# Patient Record
Sex: MALE | Race: WHITE | Employment: FULL TIME | URBAN - NONMETROPOLITAN AREA
[De-identification: names, ages, dates, MRNs, and addresses within clinical notes are randomized per-mention and may not be internally consistent; named-entity substitution may affect disease eponyms.]

---

## 2020-01-02 ENCOUNTER — HOSPITAL ENCOUNTER (OUTPATIENT)
Age: 60
Discharge: HOME | End: 2020-01-02
Payer: COMMERCIAL

## 2020-01-03 ENCOUNTER — HOSPITAL ENCOUNTER (OUTPATIENT)
Dept: GENERAL RADIOLOGY | Age: 60
Discharge: HOME | End: 2020-01-03
Payer: COMMERCIAL

## 2020-01-03 PROCEDURE — 71046 X-RAY EXAM CHEST 2 VIEWS: CPT

## 2020-03-24 ENCOUNTER — OFFICE VISIT (OUTPATIENT)
Dept: PRIMARY CARE CLINIC | Age: 60
End: 2020-03-24
Payer: COMMERCIAL

## 2020-03-24 VITALS
OXYGEN SATURATION: 97 % | TEMPERATURE: 97.7 F | SYSTOLIC BLOOD PRESSURE: 186 MMHG | HEART RATE: 122 BPM | DIASTOLIC BLOOD PRESSURE: 96 MMHG

## 2020-03-24 PROCEDURE — 99203 OFFICE O/P NEW LOW 30 MIN: CPT | Performed by: NURSE PRACTITIONER

## 2020-03-24 RX ORDER — SIMVASTATIN 20 MG
20 TABLET ORAL NIGHTLY
COMMUNITY

## 2020-03-24 RX ORDER — FINASTERIDE 5 MG/1
5 TABLET, FILM COATED ORAL DAILY
COMMUNITY

## 2020-03-24 RX ORDER — TAMSULOSIN HYDROCHLORIDE 0.4 MG/1
0.4 CAPSULE ORAL NIGHTLY
COMMUNITY

## 2020-03-24 RX ORDER — FLUTICASONE PROPIONATE 50 MCG
1 SPRAY, SUSPENSION (ML) NASAL DAILY
COMMUNITY

## 2020-03-24 RX ORDER — ALBUTEROL SULFATE 90 UG/1
1 AEROSOL, METERED RESPIRATORY (INHALATION) EVERY 4 HOURS PRN
COMMUNITY

## 2020-03-24 RX ORDER — BENAZEPRIL HYDROCHLORIDE 20 MG/1
20 TABLET ORAL DAILY
COMMUNITY

## 2020-03-24 RX ORDER — GLIMEPIRIDE 4 MG/1
4 TABLET ORAL
COMMUNITY

## 2020-03-24 RX ORDER — ALLOPURINOL 300 MG/1
300 TABLET ORAL DAILY
COMMUNITY

## 2020-03-24 ASSESSMENT — ENCOUNTER SYMPTOMS
EYES NEGATIVE: 1
ABDOMINAL PAIN: 0
EYE PAIN: 0
COLOR CHANGE: 0
SORE THROAT: 0
VOMITING: 0
NAUSEA: 0
TROUBLE SWALLOWING: 0
RHINORRHEA: 0
DIARRHEA: 0
SHORTNESS OF BREATH: 1
ALLERGIC/IMMUNOLOGIC NEGATIVE: 1
WHEEZING: 0
GASTROINTESTINAL NEGATIVE: 1
EYE REDNESS: 0
COUGH: 0

## 2020-03-24 NOTE — PROGRESS NOTES
[address]  [address]  [place], [address]  [phone]      Elicia Pretty is a 60 y.o. male that presents for Shortness of Breath (He is SOB, no cough, no vomiting or diarrhea, no headache.)      URI Symptoms    HPI:      Symptoms have been present for 7-10 days  Symptoms are unchanged since they initially started. Reports that one day he had loss of sense of taste and smell, this resolved. His PCP ordered a chest xray back in January because he was having SOB. The SOB has been intermittent, will have intermittent episodes of SOB. He has been using albuterol. He does have a Hx of asthma and he was treated with Breo, but has been out of it for about 3 weeks. Noticed his breathing has gotten worse since. He contacted his mail order several weeks ago but hasn't heard back from them about the Rx. He has been using his albuterol several times/day. He has been on the [facility] for quite some time    Fever? No  Runny nose or congestion? No   Cough? Yes - some mucous  Sore throat? No  Headache, fatigue, joint pains, muscle aches? No  Shortness of breath/Wheezing? Yes - SOB  Nausea/Vomiting/Diarrhea? No  Double Sickening? No  Sick contacts? No    Patient has tried albuterol without improvement. I have reviewed the patient's past medical history, past surgical history, allergies,medications, social and family history and I have made updates where appropriate. Past Medical History:   Diagnosis Date    Asthma        No past surgical history on file. Social History     Tobacco Use    Smoking status: Never Smoker    Smokeless tobacco: Never Used   Substance Use Topics    Alcohol use: Not on file    Drug use: Not on file       No family history on file. Review of Systems   Constitutional: Negative. Negative for chills, diaphoresis and fatigue. HENT: Negative. Negative for congestion, rhinorrhea, sore throat and trouble swallowing. Eyes: Negative.   Negative for pain, redness and visual disturbance. Respiratory: Positive for shortness of breath. Negative for cough and wheezing. Cardiovascular: Negative. Negative for chest pain. Gastrointestinal: Negative. Negative for abdominal pain, diarrhea, nausea and vomiting. Endocrine: Negative. Negative for polydipsia, polyphagia and polyuria. Genitourinary: Negative. Negative for decreased urine volume, dysuria, frequency and urgency. Musculoskeletal: Negative. Negative for arthralgias and myalgias. Skin: Negative. Negative for color change and rash. Allergic/Immunologic: Negative. Neurological: Negative. Negative for dizziness, tremors, syncope and headaches. Hematological: Negative. Negative for adenopathy. Psychiatric/Behavioral: Negative. All other systems reviewed and are negative.         PHYSICAL EXAM:  BP (!) 186/96   Pulse 122   Temp 97.7 °F (36.5 °C) (Oral)   SpO2 97%     General Appearance: well developed and well- nourished, in no acute distress  Head: normocephalic and atraumatic  Eyes: pupils equal, round, and reactive to light,  conjunctivae and eye lids without erythema  ENT: external ear and ear canal normal bilaterally, nose without deformity, nasal mucosa and turbinates normal without polyps, oropharynx normal, dentition is normal for age, no lip or gum lesions noted  Neck: supple and non-tender without mass, no thyromegaly or thyroid nodules, no cervical lymphadenopathy  Pulmonary/Chest: clear to auscultation bilaterally- no wheezes, rales or rhonchi, normal air movement, no respiratory distress orretractions  Cardiovascular: normal rate, regular rhythm, normal S1 and S2, no murmurs, rubs, clicks, or gallops,distal pulses intact  Abdomen: soft, non-tender, non-distended, normal bowel sounds,  no rebound or guarding, nomasses or hernias noted, no hepatospleenomegaly  Extremities: no cyanosis, clubbing or edema of the lowerextremities  Musculoskeletal: No joint swelling or gross deformity   Neuro:  Alert, 5/5 strength globally and symmetrically,  normal speech, no focal findings or movement disorder noted, gait wnl  Psych:  Normal affect without evidence of depression or anxiety, insight and judgement are appropriate, memory appears intact  Skin: warm and dry, no rash or erythema  Lymph:  No cervical, auricular or supraclavicular lymph nodes palpated    ASSESSMENT & PLAN  Bailey Jamison was seen today for shortness of breath. Diagnoses and all orders for this visit:    Moderate persistent asthma without complication  -     Fluticasone furoate-vilanterol (BREO ELLIPTA) 200-25 MCG/INH AEPB inhaler; Inhale 1 puff into the lungs daily    Encounter for medication refill  -     Fluticasone furoate-vilanterol (BREO ELLIPTA) 200-25 MCG/INH AEPB inhaler; Inhale 1 puff into the lungs daily      - no indications for flu/strep testing, patient's symptoms most likely due to needing his Breo Rx for his asthma. No fever, and no cough, low likelihood of COVID-19.  - medication refill for Breo  - con't albuterol prn  - advised to f/u PCP or ER if symptoms worsening or not improving    Return if symptoms worsen or fail to improve. Bailey Jamison received counseling on the following healthy behaviors: hand hygiene  Reviewedprior labs and health maintenance. Continue current medications, diet and exercise. Discussed use, benefit, and side effects of prescribed medications. Barriers to medication compliance addressed. Patient given educationalmaterials - see patient instructions. All patient questions answered. Patient voiced understanding.

## 2020-03-26 ENCOUNTER — TELEPHONE (OUTPATIENT)
Dept: PRIMARY CARE CLINIC | Age: 60
End: 2020-03-26

## 2020-04-04 ENCOUNTER — OFFICE VISIT (OUTPATIENT)
Dept: PRIMARY CARE CLINIC | Age: 60
End: 2020-04-04
Payer: COMMERCIAL

## 2020-04-04 VITALS
HEART RATE: 98 BPM | DIASTOLIC BLOOD PRESSURE: 92 MMHG | RESPIRATION RATE: 24 BRPM | SYSTOLIC BLOOD PRESSURE: 150 MMHG | TEMPERATURE: 98.1 F | OXYGEN SATURATION: 99 %

## 2020-04-04 PROCEDURE — 99213 OFFICE O/P EST LOW 20 MIN: CPT | Performed by: NURSE PRACTITIONER

## 2020-04-04 RX ORDER — PREDNISONE 20 MG/1
20 TABLET ORAL 2 TIMES DAILY
Qty: 10 TABLET | Refills: 0 | Status: SHIPPED | OUTPATIENT
Start: 2020-04-04 | End: 2020-04-09

## 2020-04-04 RX ORDER — PREDNISONE 20 MG/1
20 TABLET ORAL 2 TIMES DAILY
Qty: 10 TABLET | Refills: 0 | Status: SHIPPED | OUTPATIENT
Start: 2020-04-04 | End: 2020-04-04 | Stop reason: SDUPTHER

## 2020-04-04 ASSESSMENT — ENCOUNTER SYMPTOMS
ABDOMINAL DISTENTION: 0
ABDOMINAL PAIN: 0
STRIDOR: 0
VOMITING: 0
DIARRHEA: 0
CHEST TIGHTNESS: 0
CONSTIPATION: 0
NAUSEA: 0
COLOR CHANGE: 0
BACK PAIN: 0
COUGH: 0
SHORTNESS OF BREATH: 1
WHEEZING: 0
SINUS PRESSURE: 0
SORE THROAT: 0

## 2020-04-04 NOTE — PROGRESS NOTES
[facility]  [facility] [address]  [address] [place] [address]  Dept: [phone]  Dept Fax: [phone]  Loc: [phone]    Artem Garcia is a 60 y.o. male who presents today for his medical conditions/complaints as noted below. Chief Complaint   Patient presents with    Follow-up     here on 3/24. Started in Jan w/SOB. Had CXR in Jan. Was given Breo inhaler. Still SOB. Denies fever or cough. HPI:     HPI    This is a 60year old gentleman who presents today for SOB. Not really any cough or congestion. Mostly SOB with activity. Started in December of last year and went to ED. Checked out and discharged home. Dx with asthma. Did get better but came back around 2 weeks ago. Was seen here and started on Breo. Does help some but still has problems with some SOB. Also has allergies. Denies fever, cough, congestion. Denies any CP. Past Medical History:   Diagnosis Date    Asthma       No past surgical history on file. No family history on file.     Social History     Tobacco Use    Smoking status: Never Smoker    Smokeless tobacco: Never Used   Substance Use Topics    Alcohol use: Not on file      Current Outpatient Medications   Medication Sig Dispense Refill    predniSONE (DELTASONE) 20 MG tablet Take 1 tablet by mouth 2 times daily for 5 days 10 tablet 0    fluticasone (FLONASE) 50 MCG/ACT nasal spray 1 spray by Each Nostril route daily 1-2 sprays in each nostril daily      albuterol sulfate HFA (PROAIR HFA) 108 (90 Base) MCG/ACT inhaler Inhale 1 puff into the lungs every 4 hours as needed for Wheezing      finasteride (PROSCAR) 5 MG tablet Take 5 mg by mouth daily      tamsulosin (FLOMAX) 0.4 MG capsule Take 0.4 mg by mouth nightly      benazepril (LOTENSIN) 20 MG tablet Take 20 mg by mouth daily      allopurinol (ZYLOPRIM) 300 MG tablet Take 300 mg by mouth daily      simvastatin (ZOCOR) 20 MG tablet Take 20 pallor and rash. Neurological: Negative for dizziness, syncope, weakness, light-headedness, numbness and headaches. Hematological: Negative for adenopathy. Psychiatric/Behavioral: Negative for behavioral problems, self-injury and sleep disturbance. The patient is not nervous/anxious. Objective:     Physical Exam  Vitals signs and nursing note reviewed. Constitutional:       Appearance: Normal appearance. He is well-developed. HENT:      Head: Normocephalic. Right Ear: Tympanic membrane and external ear normal.      Left Ear: Tympanic membrane and external ear normal.      Nose: Nose normal.      Right Sinus: No maxillary sinus tenderness. Left Sinus: No maxillary sinus tenderness. Mouth/Throat:      Pharynx: Uvula midline. Eyes:      Pupils: Pupils are equal, round, and reactive to light. Neck:      Musculoskeletal: Normal range of motion and neck supple. Thyroid: No thyromegaly. Vascular: No carotid bruit or JVD. Trachea: Trachea normal.   Cardiovascular:      Rate and Rhythm: Normal rate and regular rhythm. Heart sounds: Normal heart sounds. No murmur. Pulmonary:      Effort: Pulmonary effort is normal. No respiratory distress. Breath sounds: Normal breath sounds. No decreased breath sounds, wheezing, rhonchi or rales. Chest:      Chest wall: No tenderness. Abdominal:      General: Bowel sounds are normal. There is no distension. Palpations: Abdomen is soft. There is no mass. Tenderness: There is no abdominal tenderness. Musculoskeletal: Normal range of motion. General: No tenderness or deformity. Lymphadenopathy:      Cervical: No cervical adenopathy. Skin:     General: Skin is warm and dry. Findings: No erythema or rash. Neurological:      Mental Status: He is alert and oriented to person, place, and time. Cranial Nerves: No cranial nerve deficit. Sensory: No sensory deficit.       Motor: No abnormal muscle tone.      Coordination: Coordination normal.      Gait: Gait normal.   Psychiatric:         Behavior: Behavior normal.         Thought Content: Thought content normal.         Judgment: Judgment normal.       BP (!) 150/92   Pulse 98   Temp 98.1 °F (36.7 °C) (Oral)   Resp 24   SpO2 99%     Assessment:       Diagnosis Orders   1. Dyspnea on exertion         Plan:     Prednisone as prescribed  Monitor sugars  Follow up with PCP for possible PFT  Unlikely related to pandemic    Discussed use, benefit, and side effects of prescribed medications. Barriers tomedication compliance addressed. All patient questions answered. Pt voiced understanding. Return if symptoms worsen or fail to improve. No orders of the defined types were placed in this encounter. Orders Placed This Encounter   Medications    DISCONTD: predniSONE (DELTASONE) 20 MG tablet     Sig: Take 1 tablet by mouth 2 times daily for 5 days     Dispense:  10 tablet     Refill:  0    predniSONE (DELTASONE) 20 MG tablet     Sig: Take 1 tablet by mouth 2 times daily for 5 days     Dispense:  10 tablet     Refill:  0        Reccommended tobacco cessation options including pharmacologicmethods, counseled great than 3 minutes during this visit:  Yes  []  No  []     Patient given educational materials - see patient instructions. Discussed use, benefit, and sideeffects of prescribed medications. All patient questions answered. Pt voiced understanding. Reviewed health maintenance. Instructed to continue current medications, diet and exercise. Patient agreed with treatment plan. Follow up as directed.      Electronically signed by ADRY Day CNP on 4/4/2020 at 3:58 PM

## 2020-05-11 ENCOUNTER — HOSPITAL ENCOUNTER (OUTPATIENT)
Dept: GENERAL RADIOLOGY | Age: 60
Discharge: HOME | End: 2020-05-11
Payer: COMMERCIAL

## 2020-05-11 ENCOUNTER — HOSPITAL ENCOUNTER (OUTPATIENT)
Age: 60
Discharge: HOME | End: 2020-05-11
Payer: COMMERCIAL

## 2020-05-11 PROCEDURE — 73130 X-RAY EXAM OF HAND: CPT

## 2020-05-11 PROCEDURE — 73564 X-RAY EXAM KNEE 4 OR MORE: CPT

## 2020-05-11 PROCEDURE — 73110 X-RAY EXAM OF WRIST: CPT

## 2020-06-19 ENCOUNTER — HOSPITAL ENCOUNTER (OUTPATIENT)
Dept: GENERAL RADIOLOGY | Age: 60
Discharge: HOME | End: 2020-06-19
Payer: COMMERCIAL

## 2020-06-19 ENCOUNTER — HOSPITAL ENCOUNTER (OUTPATIENT)
Age: 60
Discharge: HOME | End: 2020-06-19
Payer: COMMERCIAL

## 2020-06-19 PROCEDURE — 73110 X-RAY EXAM OF WRIST: CPT

## 2020-07-20 ENCOUNTER — HOSPITAL ENCOUNTER (OUTPATIENT)
Dept: GENERAL RADIOLOGY | Age: 60
Discharge: HOME | DRG: 644 | End: 2020-07-20
Payer: COMMERCIAL

## 2020-07-20 ENCOUNTER — HOSPITAL ENCOUNTER (OUTPATIENT)
Age: 60
Discharge: HOME | DRG: 644 | End: 2020-07-20
Payer: COMMERCIAL

## 2020-07-20 PROCEDURE — 73110 X-RAY EXAM OF WRIST: CPT

## 2020-07-21 ENCOUNTER — APPOINTMENT (OUTPATIENT)
Dept: CT IMAGING | Age: 60
DRG: 644 | End: 2020-07-21
Payer: COMMERCIAL

## 2020-07-21 ENCOUNTER — HOSPITAL ENCOUNTER (INPATIENT)
Age: 60
LOS: 5 days | Discharge: ANOTHER HOSPITAL | DRG: 644 | End: 2020-07-26
Attending: EMERGENCY MEDICINE | Admitting: INTERNAL MEDICINE
Payer: COMMERCIAL

## 2020-07-21 PROBLEM — E87.1 HYPONATREMIA: Status: ACTIVE | Noted: 2020-07-21

## 2020-07-21 LAB
ANION GAP: 7 MEQ/L (ref 8–16)
BASOPHILS # BLD: 0.3 % (ref 0–3)
BUN BLDV-MCNC: 14 MG/DL (ref 7–18)
CHLORIDE BLD-SCNC: 94 MEQ/L (ref 98–107)
CO2: 26 MEQ/L (ref 21–32)
CREAT SERPL-MCNC: 1.1 MG/DL (ref 0.6–1.3)
EKG ATRIAL RATE: 54 BPM
EKG P AXIS: 43 DEGREES
EKG P-R INTERVAL: 204 MS
EKG Q-T INTERVAL: 442 MS
EKG QRS DURATION: 98 MS
EKG QTC CALCULATION (BAZETT): 419 MS
EKG R AXIS: 22 DEGREES
EKG T AXIS: 63 DEGREES
EKG VENTRICULAR RATE: 54 BPM
EOSINOPHILS RELATIVE PERCENT: 2.4 % (ref 0–4)
GFR, ESTIMATED: 72 ML/MIN/1.73M2
GLUCOSE BLD-MCNC: 279 MG/DL (ref 74–106)
GLUCOSE BLD-MCNC: 302 MG/DL (ref 70–108)
HCT VFR BLD CALC: 40.6 % (ref 42–52)
HEMOGLOBIN: 13.9 GM/DL (ref 14–18)
LYMPHOCYTES # BLD: 16.9 % (ref 15–47)
MCH RBC QN AUTO: 32 PG (ref 27–31)
MCHC RBC AUTO-ENTMCNC: 34.2 GM/DL (ref 33–37)
MCV RBC AUTO: 93.5 FL (ref 80–94)
MONOCYTES: 7.8 % (ref 0–12)
PDW BLD-RTO: 13.9 % (ref 11.5–14.5)
PLATELET # BLD: 211 THOU/MM3 (ref 130–400)
PMV BLD AUTO: 7.3 FL (ref 7.4–10.4)
POC CALCIUM: 8.3 MG/DL (ref 8.5–10.1)
POTASSIUM SERPL-SCNC: 4.4 MEQ/L (ref 3.5–5.1)
RBC # BLD: 4.34 MILL/MM3 (ref 4.7–6.1)
SARS-COV-2, NAAT: NOT DETECTED
SEGS: 72.6 % (ref 43–75)
SODIUM BLD-SCNC: 127 MEQ/L (ref 136–145)
TROPONIN I: 0.02 NG/ML (ref 0.02–0.05)
WBC # BLD: 6.7 THOU/MM3 (ref 4.8–10.8)

## 2020-07-21 PROCEDURE — 85025 COMPLETE CBC W/AUTO DIFF WBC: CPT

## 2020-07-21 PROCEDURE — 93005 ELECTROCARDIOGRAM TRACING: CPT | Performed by: EMERGENCY MEDICINE

## 2020-07-21 PROCEDURE — 82948 REAGENT STRIP/BLOOD GLUCOSE: CPT

## 2020-07-21 PROCEDURE — 2709999900 HC NON-CHARGEABLE SUPPLY

## 2020-07-21 PROCEDURE — 70450 CT HEAD/BRAIN W/O DYE: CPT

## 2020-07-21 PROCEDURE — 80048 BASIC METABOLIC PNL TOTAL CA: CPT

## 2020-07-21 PROCEDURE — 99223 1ST HOSP IP/OBS HIGH 75: CPT | Performed by: FAMILY MEDICINE

## 2020-07-21 PROCEDURE — 36415 COLL VENOUS BLD VENIPUNCTURE: CPT

## 2020-07-21 PROCEDURE — 99282 EMERGENCY DEPT VISIT SF MDM: CPT

## 2020-07-21 PROCEDURE — BW28ZZZ COMPUTERIZED TOMOGRAPHY (CT SCAN) OF HEAD: ICD-10-PCS | Performed by: FAMILY MEDICINE

## 2020-07-21 PROCEDURE — 96374 THER/PROPH/DIAG INJ IV PUSH: CPT

## 2020-07-21 PROCEDURE — 2580000003 HC RX 258: Performed by: EMERGENCY MEDICINE

## 2020-07-21 PROCEDURE — 2W3CX2Z IMMOBILIZATION OF RIGHT LOWER ARM USING CAST: ICD-10-PCS | Performed by: FAMILY MEDICINE

## 2020-07-21 PROCEDURE — 6360000004 HC RX CONTRAST MEDICATION: Performed by: EMERGENCY MEDICINE

## 2020-07-21 PROCEDURE — 96376 TX/PRO/DX INJ SAME DRUG ADON: CPT

## 2020-07-21 PROCEDURE — 84484 ASSAY OF TROPONIN QUANT: CPT

## 2020-07-21 PROCEDURE — 6360000002 HC RX W HCPCS: Performed by: EMERGENCY MEDICINE

## 2020-07-21 PROCEDURE — U0002 COVID-19 LAB TEST NON-CDC: HCPCS

## 2020-07-21 PROCEDURE — 2060000000 HC ICU INTERMEDIATE R&B

## 2020-07-21 PROCEDURE — 70496 CT ANGIOGRAPHY HEAD: CPT

## 2020-07-21 PROCEDURE — 96375 TX/PRO/DX INJ NEW DRUG ADDON: CPT

## 2020-07-21 RX ORDER — 0.9 % SODIUM CHLORIDE 0.9 %
1000 INTRAVENOUS SOLUTION INTRAVENOUS ONCE
Status: COMPLETED | OUTPATIENT
Start: 2020-07-21 | End: 2020-07-22

## 2020-07-21 RX ORDER — BUDESONIDE AND FORMOTEROL FUMARATE DIHYDRATE 160; 4.5 UG/1; UG/1
2 AEROSOL RESPIRATORY (INHALATION) 2 TIMES DAILY
Status: DISCONTINUED | OUTPATIENT
Start: 2020-07-21 | End: 2020-07-26 | Stop reason: HOSPADM

## 2020-07-21 RX ORDER — FENTANYL CITRATE 50 UG/ML
50 INJECTION, SOLUTION INTRAMUSCULAR; INTRAVENOUS ONCE
Status: COMPLETED | OUTPATIENT
Start: 2020-07-21 | End: 2020-07-21

## 2020-07-21 RX ORDER — DIPHENHYDRAMINE HYDROCHLORIDE 50 MG/ML
25 INJECTION INTRAMUSCULAR; INTRAVENOUS ONCE
Status: COMPLETED | OUTPATIENT
Start: 2020-07-21 | End: 2020-07-21

## 2020-07-21 RX ORDER — 0.9 % SODIUM CHLORIDE 0.9 %
500 INTRAVENOUS SOLUTION INTRAVENOUS ONCE
Status: COMPLETED | OUTPATIENT
Start: 2020-07-21 | End: 2020-07-21

## 2020-07-21 RX ORDER — TAMSULOSIN HYDROCHLORIDE 0.4 MG/1
0.4 CAPSULE ORAL NIGHTLY
Status: DISCONTINUED | OUTPATIENT
Start: 2020-07-21 | End: 2020-07-26 | Stop reason: HOSPADM

## 2020-07-21 RX ORDER — HYDRALAZINE HYDROCHLORIDE 20 MG/ML
20 INJECTION INTRAMUSCULAR; INTRAVENOUS ONCE
Status: COMPLETED | OUTPATIENT
Start: 2020-07-21 | End: 2020-07-21

## 2020-07-21 RX ORDER — ACETAMINOPHEN 650 MG/1
650 SUPPOSITORY RECTAL EVERY 6 HOURS PRN
Status: DISCONTINUED | OUTPATIENT
Start: 2020-07-21 | End: 2020-07-26 | Stop reason: HOSPADM

## 2020-07-21 RX ORDER — FINASTERIDE 5 MG/1
5 TABLET, FILM COATED ORAL DAILY
Status: DISCONTINUED | OUTPATIENT
Start: 2020-07-22 | End: 2020-07-26 | Stop reason: HOSPADM

## 2020-07-21 RX ORDER — KETOROLAC TROMETHAMINE 30 MG/ML
15 INJECTION, SOLUTION INTRAMUSCULAR; INTRAVENOUS ONCE
Status: COMPLETED | OUTPATIENT
Start: 2020-07-21 | End: 2020-07-21

## 2020-07-21 RX ORDER — MORPHINE SULFATE 10 MG/ML
6 INJECTION, SOLUTION INTRAMUSCULAR; INTRAVENOUS ONCE
Status: COMPLETED | OUTPATIENT
Start: 2020-07-21 | End: 2020-07-21

## 2020-07-21 RX ORDER — POLYETHYLENE GLYCOL 3350 17 G/17G
17 POWDER, FOR SOLUTION ORAL DAILY PRN
Status: DISCONTINUED | OUTPATIENT
Start: 2020-07-21 | End: 2020-07-26 | Stop reason: HOSPADM

## 2020-07-21 RX ORDER — SODIUM CHLORIDE 0.9 % (FLUSH) 0.9 %
10 SYRINGE (ML) INJECTION PRN
Status: DISCONTINUED | OUTPATIENT
Start: 2020-07-21 | End: 2020-07-26 | Stop reason: HOSPADM

## 2020-07-21 RX ORDER — HYDRALAZINE HYDROCHLORIDE 20 MG/ML
10 INJECTION INTRAMUSCULAR; INTRAVENOUS ONCE
Status: DISCONTINUED | OUTPATIENT
Start: 2020-07-21 | End: 2020-07-21

## 2020-07-21 RX ORDER — ALLOPURINOL 300 MG/1
300 TABLET ORAL DAILY
Status: DISCONTINUED | OUTPATIENT
Start: 2020-07-22 | End: 2020-07-26 | Stop reason: HOSPADM

## 2020-07-21 RX ORDER — PROMETHAZINE HYDROCHLORIDE 25 MG/ML
12.5 INJECTION, SOLUTION INTRAMUSCULAR; INTRAVENOUS ONCE
Status: COMPLETED | OUTPATIENT
Start: 2020-07-21 | End: 2020-07-21

## 2020-07-21 RX ORDER — SODIUM CHLORIDE 0.9 % (FLUSH) 0.9 %
10 SYRINGE (ML) INJECTION EVERY 12 HOURS SCHEDULED
Status: DISCONTINUED | OUTPATIENT
Start: 2020-07-21 | End: 2020-07-26 | Stop reason: HOSPADM

## 2020-07-21 RX ORDER — FLUTICASONE PROPIONATE 50 MCG
1 SPRAY, SUSPENSION (ML) NASAL DAILY
Status: DISCONTINUED | OUTPATIENT
Start: 2020-07-22 | End: 2020-07-26 | Stop reason: HOSPADM

## 2020-07-21 RX ORDER — PROMETHAZINE HYDROCHLORIDE 25 MG/1
12.5 TABLET ORAL EVERY 6 HOURS PRN
Status: DISCONTINUED | OUTPATIENT
Start: 2020-07-21 | End: 2020-07-26 | Stop reason: HOSPADM

## 2020-07-21 RX ORDER — 0.9 % SODIUM CHLORIDE 0.9 %
250 INTRAVENOUS SOLUTION INTRAVENOUS ONCE
Status: COMPLETED | OUTPATIENT
Start: 2020-07-21 | End: 2020-07-21

## 2020-07-21 RX ORDER — HYDRALAZINE HYDROCHLORIDE 20 MG/ML
10 INJECTION INTRAMUSCULAR; INTRAVENOUS ONCE
Status: COMPLETED | OUTPATIENT
Start: 2020-07-21 | End: 2020-07-21

## 2020-07-21 RX ORDER — ACETAMINOPHEN 325 MG/1
650 TABLET ORAL EVERY 6 HOURS PRN
Status: DISCONTINUED | OUTPATIENT
Start: 2020-07-21 | End: 2020-07-26 | Stop reason: HOSPADM

## 2020-07-21 RX ORDER — LISINOPRIL 20 MG/1
20 TABLET ORAL DAILY
Status: DISCONTINUED | OUTPATIENT
Start: 2020-07-22 | End: 2020-07-26 | Stop reason: HOSPADM

## 2020-07-21 RX ORDER — ONDANSETRON 2 MG/ML
4 INJECTION INTRAMUSCULAR; INTRAVENOUS EVERY 6 HOURS PRN
Status: DISCONTINUED | OUTPATIENT
Start: 2020-07-21 | End: 2020-07-26 | Stop reason: HOSPADM

## 2020-07-21 RX ORDER — ATORVASTATIN CALCIUM 10 MG/1
10 TABLET, FILM COATED ORAL NIGHTLY
Status: DISCONTINUED | OUTPATIENT
Start: 2020-07-21 | End: 2020-07-26 | Stop reason: HOSPADM

## 2020-07-21 RX ORDER — SODIUM CHLORIDE 9 MG/ML
INJECTION, SOLUTION INTRAVENOUS CONTINUOUS
Status: DISCONTINUED | OUTPATIENT
Start: 2020-07-21 | End: 2020-07-22

## 2020-07-21 RX ADMIN — KETOROLAC TROMETHAMINE 15 MG: 30 INJECTION, SOLUTION INTRAMUSCULAR at 16:28

## 2020-07-21 RX ADMIN — FENTANYL CITRATE 50 MCG: 50 INJECTION INTRAMUSCULAR; INTRAVENOUS at 17:26

## 2020-07-21 RX ADMIN — DIPHENHYDRAMINE HYDROCHLORIDE 25 MG: 50 INJECTION INTRAMUSCULAR; INTRAVENOUS at 16:27

## 2020-07-21 RX ADMIN — HYDRALAZINE HYDROCHLORIDE 10 MG: 20 INJECTION, SOLUTION INTRAMUSCULAR; INTRAVENOUS at 17:27

## 2020-07-21 RX ADMIN — SODIUM CHLORIDE 500 ML: 9 INJECTION, SOLUTION INTRAVENOUS at 19:05

## 2020-07-21 RX ADMIN — IOPAMIDOL 100 ML: 755 INJECTION, SOLUTION INTRAVENOUS at 17:45

## 2020-07-21 RX ADMIN — PROMETHAZINE HYDROCHLORIDE 12.5 MG: 25 INJECTION INTRAMUSCULAR; INTRAVENOUS at 16:28

## 2020-07-21 RX ADMIN — SODIUM CHLORIDE 250 ML: 9 INJECTION, SOLUTION INTRAVENOUS at 18:20

## 2020-07-21 RX ADMIN — HYDRALAZINE HYDROCHLORIDE 20 MG: 20 INJECTION INTRAMUSCULAR; INTRAVENOUS at 18:42

## 2020-07-21 RX ADMIN — MORPHINE SULFATE 6 MG: 10 INJECTION INTRAVENOUS at 19:59

## 2020-07-21 ASSESSMENT — PAIN DESCRIPTION - DESCRIPTORS
DESCRIPTORS: THROBBING
DESCRIPTORS: PRESSURE
DESCRIPTORS: PRESSURE;DULL
DESCRIPTORS: SORE
DESCRIPTORS: DULL;PRESSURE
DESCRIPTORS: THROBBING

## 2020-07-21 ASSESSMENT — PAIN SCALES - GENERAL
PAINLEVEL_OUTOF10: 8
PAINLEVEL_OUTOF10: 5
PAINLEVEL_OUTOF10: 6
PAINLEVEL_OUTOF10: 3
PAINLEVEL_OUTOF10: 6
PAINLEVEL_OUTOF10: 7
PAINLEVEL_OUTOF10: 6
PAINLEVEL_OUTOF10: 6
PAINLEVEL_OUTOF10: 5

## 2020-07-21 ASSESSMENT — ENCOUNTER SYMPTOMS
DIARRHEA: 0
CHEST TIGHTNESS: 0
BACK PAIN: 0
BLOOD IN STOOL: 0
VOICE CHANGE: 0
NAUSEA: 0
SHORTNESS OF BREATH: 0
ABDOMINAL PAIN: 0
VOMITING: 0
COUGH: 0
TROUBLE SWALLOWING: 0

## 2020-07-21 ASSESSMENT — PAIN DESCRIPTION - PROGRESSION
CLINICAL_PROGRESSION: NOT CHANGED
CLINICAL_PROGRESSION: RAPIDLY IMPROVING
CLINICAL_PROGRESSION: NOT CHANGED
CLINICAL_PROGRESSION: NOT CHANGED
CLINICAL_PROGRESSION: GRADUALLY IMPROVING

## 2020-07-21 ASSESSMENT — PAIN DESCRIPTION - LOCATION
LOCATION: HEAD
LOCATION: EYE
LOCATION: HEAD

## 2020-07-21 ASSESSMENT — PAIN DESCRIPTION - ORIENTATION
ORIENTATION: ANTERIOR

## 2020-07-21 ASSESSMENT — PAIN DESCRIPTION - PAIN TYPE
TYPE: ACUTE PAIN

## 2020-07-21 ASSESSMENT — PAIN DESCRIPTION - FREQUENCY
FREQUENCY: CONTINUOUS
FREQUENCY: CONTINUOUS

## 2020-07-21 NOTE — H&P
History & Physical       Patient: Michelle Flores  YOB: 1960    MRN: [id number]     Acct: [de-identified]    PCP: Kavitha Enciso DO    Date of Admission: 7/21/2020    Date of Service: Patient seen / examined on 07/22/20 and admitted to outpatient with expected LOS less than two midnights due to medical therapy. ASSESSMENT / PLAN:    Acute, intractable headache   Located behind both eyes / no associated symptoms (see HPI below). No neurologic deficits on exam. + 1.6 cm pituitary lesion on CT/CTA with noted mass effect along the L cavernous sinus. Minimal improvement with IVF, toradol, fentanyl, morphine, phenergan and benadryl administered at outside ED. Remains hemodynamically and neurologically appropriate on RA. Admit to stepdown unit. Routine neuro checks. Analgesics / antiemetics as needed. MRI brain W/WO contrast ordered. Neurology and neurosurgery consulted for additional recommendations -- will keep NPO pending evaluation. 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 Patient aware of CT findings. Per above. Acute on chronic HTN  Does not meet criteria for urgency or emergency. Received 30 mg IV hydralazine at outside ED with improvement to BP (improvement also coincided with improvement of headache). Analgesics. ACE-I resumed. Monitor BP. Mild hyponatremia, with associated hypochloremia  Na corrected to 131 for hyperglycemia (). Etiology unclear, but mild dehydration suspected. Trial MIVF overnight. Repeat BMP in the AM. Will check SOsm, UOsm, [place] if sodium level worsens or fails to improve with IVF administration. Mild hypocalcemia  No albumin level available. Check iCa to confirm. Replace as needed. Sinus bradycardia, resolved  EKG at [facility] ED very poor quality (no previous available). Patient is not currently prescribed any outpatient AV blockers. HR high 50s-90s since arrival to [facility].  Maintain telemetry. May require additional IP/OP workup pending ongoing clinical course. NIDDM2 with hyperglycemia  Stress may be contributing. Metformin, amaryl held. SSI as needed (target inpatient -180). POCT BG checks (Q6H while NPO). Hypoglycemia protocol. Diabetic diet when advanced. Reduced eGFR (with normal Cr)  ? Age-related decline in renal function / ?diabetic nephropathy. Avoid nephrotoxins. Renally dose medications. Monitor daily BMP. BPH  Flomax resumed. Gout  Allopurinol resumed. Asthma  Without acute exacerbation. Symbicort, prn albuterol resumed. Morbid obesity   on weight loss / healthy diet, exercise. May benefit from outpatient bariatrics referral.      Chief Complaint: headache    History of Present Illness:  59 y/o M PMHx HTN, NIDDM2, BPH, obesity and gout -- presents to [facility] as a direct admission from [facility] ED, where he initially presented with a chief complaint of headache. History provided by the patient. Patient presented to [facility] ED with complaints of headache which woke him from sleep 2 nights ago / described as severe (9/10) / aching / bilateral --  located behind both eyes without radiation / has persisted since initial onset / states he has never had a headache like this in the past. Denies associated vision changes (blurred / double vision), photophobia, neck pain/stiffness, nausea/emesis, weakness, paresthesias, gait instability/ataxia or syncope. Also denies fevers/chills, chest pain, palpitations, cough, shortness of breath or abdominal pain. Tried taking tylenol at home with minimal-to-no improvement. Denies recent illness or trauma. Takes ACE-I for management of chronic hypertension. No tobacco, alcohol or illicit use reported. Reports headache on arrival is 5-6/10. ED course: afebrile, bradycardic (50s), hypertensive (190s/80s), RR nml with SpO2 99% on RA. CBC grossly unremarkable. Na 127 (), Cl 94, K 4.4, Ca 8.3. Cr 1.2 / BUN 14 / eGFR 72. COVID negative. CT head WO contrast & CTA head W/WO contrast revealed enlargement of the suprasellar cistern and pituitary gland measuring 1.6 cm concerning for pituitary lesion, possible macroadenoma / + mild mass effect along the L cavernous sinus. Received 750 cc IVF, 30 mg IV hydralazine and analgesics/antiemetics with improvement to headache. Please see A&P for additional details. Past Medical History:    Diagnosis Date    Asthma     Diabetes mellitus ([address]     Gout      Past Surgical History:    Procedure Laterality Date    CARPAL TUNNEL RELEASE Bilateral       Medications Prior to Admission:   Medication Sig    fluticasone (FLONASE) 50 MCG/ACT nasal spray 1 spray by Each Nostril route daily 1-2 sprays in each nostril daily    albuterol sulfate HFA (PROAIR HFA) 108 (90 Base) MCG/ACT inhaler Inhale 1 puff into the lungs every 4 hours as needed for Wheezing    finasteride (PROSCAR) 5 MG tablet Take 5 mg by mouth daily    tamsulosin (FLOMAX) 0.4 MG capsule Take 0.4 mg by mouth nightly    benazepril (LOTENSIN) 20 MG tablet Take 20 mg by mouth daily    allopurinol (ZYLOPRIM) 300 MG tablet Take 300 mg by mouth daily    simvastatin (ZOCOR) 20 MG tablet Take 20 mg by mouth nightly    glimepiride (AMARYL) 4 MG tablet Take 4 mg by mouth every morning (before breakfast)    metFORMIN (GLUCOPHAGE) 1000 MG tablet Take 500 mg by mouth 2 times daily (with meals)    Fluticasone furoate-vilanterol (BREO ELLIPTA) 200-25 MCG/INH AEPB inhaler Inhale 1 puff into the lungs daily     Allergies:   Patient has no known allergies. Social History:  Socioeconomic History    Marital status: Single   Tobacco Use    Smoking status: Never Smoker    Smokeless tobacco: Never Used   Substance and Sexual Activity    Alcohol use: Not Currently    Drug use: Not Currently     Family History:    History reviewed. No pertinent family history.     REVIEW OF SYSTEMS:  A 14-point ROS was obtained and negative, with the exception of pertinent positives as stated in the HPI. PHYSICAL EXAM:  Vitals:    07/21/20 2100 07/21/20 2200 07/21/20 2248 07/22/20 0015   BP: (!) 145/85 (!) 178/78  (!) 170/87   Pulse: 63 57 105 98   Resp: 24 20  20   Temp:  98.2 °F (36.8 °C)  98.1 °F (36.7 °C)   TempSrc:  Oral  Oral   SpO2: 98% 95%  98%   Weight:       Height:       RA    General appearance: Alert / well-appearing  male. Cooperative. NAD. HEENT: Normocephalic / atraumatic. PERRL. EOM and visual acuity intact. Conjunctivae appear normal. No photophobia demonstrated on exam.  Neck: Supple. No JVD. Respiratory: Normal respiratory effort on RA. CTAB. No wheezes / rales / rhonchi. Cardiovascular: RRR. Normal S1/S2. No murmurs / rubs / gallops. Abdomen: Obese. Soft / non-tender / non-distended. BS present. Musculoskeletal: No cyanosis or LE edema. Skin: Warm / dry. No pallor / diaphoresis. Neurologic: A/O x 3. Speech normal. Answers questions appropriately. CN intact. Strength / sensation intact to BUE/BLE. No obvious focal neurologic deficits. Psychiatric: Thought content / judgment / insight appear appropriate. Peripheral pulses: +2 bilaterally.     Labs:   Results for orders placed or performed during the hospital encounter of 07/21/20   CBC Auto Differential   Result Value Ref Range    WBC 6.7 4.8 - 10.8 thou/mm3    RBC 4.34 (L) 4.70 - 6.10 mill/mm3    Hemoglobin 13.9 (L) 14.0 - 18.0 gm/dl    Hematocrit 40.6 (L) 42.0 - 52.0 %    MCV 93.5 80.0 - 94.0 fL    MCH 32.0 (H) 27.0 - 31.0 pg    MCHC 34.2 33.0 - 37.0 gm/dl    RDW 13.9 11.5 - 14.5 %    Platelets 047 223 - 604 thou/mm3    MPV 7.3 (L) 7.4 - 10.4 fL    SEGS 72.6 43.0 - 75.0 %    Lymphocytes 16.9 15.0 - 47.0 %    Monocytes 7.8 0.0 - 12.0 %    Eosinophils % 2.4 0.0 - 4.0 %    Basophils 0.3 0.0 - 3.0 %   Troponin   Result Value Ref Range    Troponin I 0.018 0.017 - 0.05 ng/ml   Basic Metabolic Panel   Result Value Ref Range    Sodium 127 (L) 136 - 145 meq/l    Potassium 4.4 3.5 - 5.1 meq/l    Chloride 94 (L) 98 - 107 meq/l    CO2 26 21 - 32 meq/l    Glucose 279 (H) 74 - 106 mg/dl    BUN 14 7 - 18 mg/dl    CREATININE 1.1 0.6 - 1.3 mg/dl    POC CALCIUM 8.3 (L) 8.5 - 10.1 mg/dl   Glomerular Filtration Rate, Estimated   Result Value Ref Range    GFR, Estimated 72 (A) ml/min/1.73m2   ANION GAP   Result Value Ref Range    Anion Gap 7.0 (L) 8.0 - 16.0 meq/l   COVID-19   Result Value Ref Range    SARS-CoV-2, NAAT NOT DETECTED NOT DETECT   POCT Glucose   Result Value Ref Range    POC Glucose 302 (H) 70 - 108 mg/dl   EKG 12 Lead   Result Value Ref Range    Ventricular Rate 54 BPM    Atrial Rate 54 BPM    P-R Interval 204 ms    QRS Duration 98 ms    Q-T Interval 442 ms    QTc Calculation (Bazett) 419 ms    P Axis 43 degrees    R Axis 22 degrees    T Axis 63 degrees     Narrative & Impression   *Poor data quality, interpretation may be adversely affected  Sinus bradycardia with Fusion complexes  Low voltage QRS, consider pulmonary disease, pericardial effusion, or normal variant  Incomplete right bundle branch block  Possible Inferior infarct , age undetermined  T wave abnormality, consider anterior ischemia  Abnormal ECG  No previous ECGs available     Radiology:     Xr Wrist Right (min 3 Views)  Result Date: 7/21/2020  PROCEDURE: XR WRIST RIGHT (MIN 3 VIEWS) CLINICAL INFORMATION: Closed nondisplaced fracture of scaphoid of right wrist, unspecified portion of scaphoid, initial encounter. COMPARISON: Right wrist series dated 6/19/2020 TECHNIQUE: 3 views of the right wrist FINDINGS: Bones/joints: Overlying fiberglass cast obscures fine bone detail. There is no gross change in the previously noted comminuted scaphoid waist fracture with slight radial displacement of a small fracture fragment. There is no dislocation. Again noted is the scaphoid-trapezium/trapezoid joint DJD. Soft tissues: No significant soft tissue swelling. Interval placement of a fiberglass cast obscuring fine bone detail.  No gross change in the comminuted scaphoid waist fracture as discussed above. **This report has been created using voice recognition software. It may contain minor errors which are inherent in voice recognition technology. ** Final report electronically signed by Dr. Francisco Gaston on 7/21/2020 3:24 AM    Ct Head Wo Contrast  Result Date: 7/21/2020  PROCEDURE: CT HEAD WO CONTRAST CLINICAL INFORMATION: fatigue. COMPARISON: No prior study. TECHNIQUE: Non-contrast 5 mm axial images were obtained through the brain. All CT scans at this facility use dose modulation, iterative reconstruction, and/or weight-based dosing when appropriate to reduce radiation dose to as low as reasonably achievable. FINDINGS: No acute intracranial findings. Gray-white differentiation is preserved. No acute hemorrhage or midline shift. Ventricles are within normal limits for age. Near complete opacification of the left maxillary sinus and ethmoid and sphenoid sinuses. Correlate for chronic sinus disease or polyposis. Mastoid air cells are patent. Skull: Unremarkable. Soft tissues: Unremarkable. No acute intracranial findings. Near complete opacification of the left maxillary sinus and ethmoid and sphenoid sinuses. Correlate for chronic sinus disease with bony remodeling or polyposis. Correlation with symptoms and follow-up advised. **This report has been created using voice recognition software. It may contain minor errors which are inherent in voice recognition technology. ** Final report electronically signed by Dr. Susana Jarvis on 7/21/2020 4:03 PM    CODE STATUS: FULL    Thank you Silver Lennox, DO for the opportunity to be involved in this patient's care. Electronically signed by Elieser Ashby MD on 7/22/2020.

## 2020-07-21 NOTE — ED PROVIDER NOTES
20 MG TABLET    Take 20 mg by mouth nightly    TAMSULOSIN (FLOMAX) 0.4 MG CAPSULE    Take 0.4 mg by mouth nightly       ALLERGIES     Patient has no known allergies. FAMILY HISTORY     History reviewed. No pertinent family history. SOCIAL HISTORY       Social History     Socioeconomic History    Marital status: Single     Spouse name: None    Number of children: None    Years of education: None    Highest education level: None   Occupational History    None   Social Needs    Financial resource strain: None    Food insecurity     Worry: None     Inability: None    Transportation needs     Medical: None     Non-medical: None   Tobacco Use    Smoking status: Never Smoker    Smokeless tobacco: Never Used   Substance and Sexual Activity    Alcohol use: Not Currently    Drug use: Not Currently    Sexual activity: None   Lifestyle    Physical activity     Days per week: None     Minutes per session: None    Stress: None   Relationships    Social connections     Talks on phone: None     Gets together: None     Attends Worship service: None     Active member of club or organization: None     Attends meetings of clubs or organizations: None     Relationship status: None    Intimate partner violence     Fear of current or ex partner: None     Emotionally abused: None     Physically abused: None     Forced sexual activity: None   Other Topics Concern    None   Social History Narrative    None       REVIEW OF SYSTEMS     Review of Systems   Constitutional: Positive for fatigue. Negative for chills, diaphoresis and fever. HENT: Negative for trouble swallowing and voice change. Eyes: Negative for visual disturbance. Respiratory: Negative for cough, chest tightness and shortness of breath. Cardiovascular: Negative for chest pain and leg swelling. Gastrointestinal: Negative for abdominal pain, blood in stool, diarrhea, nausea and vomiting.    Genitourinary: Negative for dysuria, frequency and hematuria. Musculoskeletal: Negative for back pain and neck pain. Skin: Negative for rash and wound. Neurological: Positive for headaches. Negative for speech difficulty, weakness and numbness. Psychiatric/Behavioral: Negative for confusion. Except as noted above the remainder of the review of systems was reviewed and is. PHYSICAL EXAM    (up to 7 for level 4, 8 or more for level 5)     ED Triage Vitals [07/21/20 1511]   BP Temp Temp Source Pulse Resp SpO2 Height Weight   (!) 192/88 96.2 °F (35.7 °C) Oral 56 20 99 % 6' (1.829 m) 285 lb (129.3 kg)       Physical Exam  Vitals signs and nursing note reviewed. Constitutional:       General: He is not in acute distress. Appearance: He is well-developed. He is not ill-appearing, toxic-appearing or diaphoretic. Comments: Hypertensive on arrival   HENT:      Head: Normocephalic and atraumatic. Eyes:      General: No scleral icterus. Conjunctiva/sclera: Conjunctivae normal.      Right eye: Right conjunctiva is not injected. Left eye: Left conjunctiva is not injected. Pupils: Pupils are equal, round, and reactive to light. Neck:      Musculoskeletal: Normal range of motion and neck supple. Thyroid: No thyromegaly. Trachea: No tracheal deviation. Cardiovascular:      Rate and Rhythm: Normal rate and regular rhythm. Heart sounds: Normal heart sounds. No murmur. No friction rub. No gallop. Pulmonary:      Effort: Pulmonary effort is normal. No respiratory distress. Breath sounds: Normal breath sounds. No stridor. No wheezing or rales. Abdominal:      General: Bowel sounds are normal. There is no distension. Palpations: Abdomen is soft. There is no mass. Tenderness: There is no abdominal tenderness. There is no guarding or rebound.       Comments: Negative Price's sign  Nontender McBurney's Point  Negative Rovsig's sign  No bruising or echymosis of abdomen   Musculoskeletal:         General: No tenderness. Comments: Negative Keven's Sign bilaterally   Lymphadenopathy:      Cervical: No cervical adenopathy. Skin:     General: Skin is warm and dry. Coloration: Skin is not pale. Findings: No erythema or rash. Neurological:      Mental Status: He is alert and oriented to person, place, and time. Cranial Nerves: No cranial nerve deficit. Motor: No abnormal muscle tone. Coordination: Coordination normal.      Comments: No nystagmus   Psychiatric:         Behavior: Behavior normal.         Thought Content: Thought content normal.         DIAGNOSTIC RESULTS     EKG:(none if blank)  All EKG's are [facility] who either signs or Co-signs this chart in the absence of a cardiologist.  EKG shows sinus rhythm, normal MD, QRS, and QTc. There is Q waves in the inferior leads and T wave inversion in precordial leads. No previous EKGs available for comparison      RADIOLOGY: (none if blank)   Interpretation per the Radiologistbelow, if available at the time of this note:    CTA HEAD W WO CONTRAST   Preliminary Result   There is enlargement of the suprasellar cistern and pituitary gland measuring 1.6 cm concerning for pituitary lesion possible macroadenoma. There is mild mass effect along the cavernous sinus on the left which may be contributing to patient's    symptoms. Contrast-enhanced MRI is advised for further evaluation. Findings were discussed with Dr. Matias Asp of at 9:09 PM on 7/21/2020   Examination on the right by a neuroradiologist tomorrow. Please see the complete report. **This report has been created using voice recognition software. It may contain minor errors which are inherent in voice recognition technology. **         CT Head WO Contrast   Final Result   No acute intracranial findings. Near complete opacification of the left maxillary sinus and ethmoid and sphenoid sinuses.  Correlate for chronic sinus disease with bony 7/21/20 1904)   hydrALAZINE (APRESOLINE) injection 20 mg (20 mg Intravenous Given 7/21/20 1842)   0.9 % sodium chloride bolus (0 mLs Intravenous Stopped 7/21/20 2015)   morphine (PF) injection 6 mg (6 mg Intravenous Given 7/1960)         Procedures: (None if blank)       CLINICAL       1. Hypertensive urgency    2. Intractable headache, unspecified chronicity pattern, unspecified headache type    3. Hyponatremia    4.  Abnormal EKG          DISPOSITION/PLAN   DISPOSITION Admitted 07/21/2020 07:46:28 PM      PATIENT REFERRED TO:  Silver Lennox, DO  [address] [place] [address]. Dr. Darnell Daniels [address]  [phone]            DISCHARGE MEDICATIONS:  New Prescriptions    No medications on file              (Please note that portions of this note were completed with a voice recognition program.  Efforts were made to edit the dictations but occasionallywords are mis-transcribed.)      Yasir Gerard DO,FACEP (electronically signed)  Attending Physician, Emergency [address] [place] [address]   07/21/20 [address] [facility]  07/21/20 5785

## 2020-07-21 NOTE — ED TRIAGE NOTES
Patient awoke with a headache early this morning, he took OTC tylenol without relief. He took another dose at 1100 again, without relief. States the pain is above his eyes and throbbing. Denies any nausea, vomiting or visual disturbances. Denies any sensitivity to light. Voices that he feels very tired and a little confused. Alert and oriented x4. Has not taken any of prescribed medications yet today.

## 2020-07-21 NOTE — ED NOTES
Report called to [facility] on [address], RN  07/21/20 1443

## 2020-07-22 ENCOUNTER — APPOINTMENT (OUTPATIENT)
Dept: GENERAL RADIOLOGY | Age: 60
DRG: 644 | End: 2020-07-22
Payer: COMMERCIAL

## 2020-07-22 ENCOUNTER — APPOINTMENT (OUTPATIENT)
Dept: MRI IMAGING | Age: 60
DRG: 644 | End: 2020-07-22
Payer: COMMERCIAL

## 2020-07-22 LAB
ALBUMIN SERPL-MCNC: 3.5 G/DL (ref 3.5–5.1)
ALP BLD-CCNC: 53 U/L (ref 38–126)
ALT SERPL-CCNC: 20 U/L (ref 11–66)
ANION GAP SERPL CALCULATED.3IONS-SCNC: 12 MEQ/L (ref 8–16)
ANION GAP SERPL CALCULATED.3IONS-SCNC: 14 MEQ/L (ref 8–16)
AST SERPL-CCNC: 23 U/L (ref 5–40)
BASOPHILS # BLD: 0.2 %
BASOPHILS ABSOLUTE: 0 THOU/MM3 (ref 0–0.1)
BILIRUB SERPL-MCNC: 0.7 MG/DL (ref 0.3–1.2)
BUN BLDV-MCNC: 10 MG/DL (ref 7–22)
BUN BLDV-MCNC: 10 MG/DL (ref 7–22)
CALCIUM IONIZED: 0.99 MMOL/L (ref 1.12–1.32)
CALCIUM IONIZED: 1.02 MMOL/L (ref 1.12–1.32)
CALCIUM SERPL-MCNC: 7.7 MG/DL (ref 8.5–10.5)
CALCIUM SERPL-MCNC: 7.7 MG/DL (ref 8.5–10.5)
CHLORIDE BLD-SCNC: 86 MEQ/L (ref 98–111)
CHLORIDE BLD-SCNC: 90 MEQ/L (ref 98–111)
CHLORIDE, URINE: 47 MEQ/L
CO2: 19 MEQ/L (ref 23–33)
CO2: 20 MEQ/L (ref 23–33)
CREAT SERPL-MCNC: 0.6 MG/DL (ref 0.4–1.2)
CREAT SERPL-MCNC: 0.8 MG/DL (ref 0.4–1.2)
CREATININE URINE: 62.9 MG/DL
EOSINOPHIL # BLD: 0.6 %
EOSINOPHILS ABSOLUTE: 0.1 THOU/MM3 (ref 0–0.4)
ERYTHROCYTE [DISTWIDTH] IN BLOOD BY AUTOMATED COUNT: 11.9 % (ref 11.5–14.5)
ERYTHROCYTE [DISTWIDTH] IN BLOOD BY AUTOMATED COUNT: 37.9 FL (ref 35–45)
GFR SERPL CREATININE-BSD FRML MDRD: > 90 ML/MIN/1.73M2
GFR SERPL CREATININE-BSD FRML MDRD: > 90 ML/MIN/1.73M2
GLUCOSE BLD-MCNC: 210 MG/DL (ref 70–108)
GLUCOSE BLD-MCNC: 239 MG/DL (ref 70–108)
GLUCOSE BLD-MCNC: 251 MG/DL (ref 70–108)
GLUCOSE BLD-MCNC: 302 MG/DL (ref 70–108)
GLUCOSE BLD-MCNC: 330 MG/DL (ref 70–108)
GLUCOSE BLD-MCNC: 360 MG/DL (ref 70–108)
HCT VFR BLD CALC: 37.5 % (ref 42–52)
HEMOGLOBIN: 13.5 GM/DL (ref 14–18)
IMMATURE GRANS (ABS): 0.07 THOU/MM3 (ref 0–0.07)
IMMATURE GRANULOCYTES: 0.6 %
LYMPHOCYTES # BLD: 6.1 %
LYMPHOCYTES ABSOLUTE: 0.7 THOU/MM3 (ref 1–4.8)
MAGNESIUM: 1.4 MG/DL (ref 1.6–2.4)
MAGNESIUM: 1.8 MG/DL (ref 1.6–2.4)
MCH RBC QN AUTO: 32.1 PG (ref 26–33)
MCHC RBC AUTO-ENTMCNC: 36 GM/DL (ref 32.2–35.5)
MCV RBC AUTO: 89.1 FL (ref 80–94)
MONOCYTES # BLD: 9.2 %
MONOCYTES ABSOLUTE: 1.1 THOU/MM3 (ref 0.4–1.3)
MRSA SCREEN RT-PCR: NEGATIVE
NUCLEATED RED BLOOD CELLS: 0 /100 WBC
OSMOLALITY URINE: 700 MOSMOL/KG (ref 250–750)
OSMOLALITY URINE: 784 MOSMOL/KG (ref 250–750)
OSMOLALITY: 259 MOSMOL/KG (ref 275–295)
PHOSPHORUS: 2.4 MG/DL (ref 2.4–4.7)
PLATELET # BLD: 183 THOU/MM3 (ref 130–400)
PMV BLD AUTO: 9.5 FL (ref 9.4–12.4)
POTASSIUM SERPL-SCNC: 4 MEQ/L (ref 3.5–5.2)
POTASSIUM SERPL-SCNC: 4.4 MEQ/L (ref 3.5–5.2)
PROT/CREAT RATIO, UR: 0.14
PROTEIN, URINE: 8.5 MG/DL
PTH INTACT: 42.7 PG/ML (ref 15–65)
RBC # BLD: 4.21 MILL/MM3 (ref 4.7–6.1)
SEG NEUTROPHILS: 83.3 %
SEGMENTED NEUTROPHILS ABSOLUTE COUNT: 10.2 THOU/MM3 (ref 1.8–7.7)
SODIUM BLD-SCNC: 116 MEQ/L (ref 135–145)
SODIUM BLD-SCNC: 117 MEQ/L (ref 135–145)
SODIUM BLD-SCNC: 118 MEQ/L (ref 135–145)
SODIUM BLD-SCNC: 119 MEQ/L (ref 135–145)
SODIUM BLD-SCNC: 122 MEQ/L (ref 135–145)
SODIUM URINE: 172 MEQ/L
SODIUM URINE: 62 MEQ/L
TOTAL PROTEIN: 5.3 G/DL (ref 6.1–8)
TSH SERPL DL<=0.05 MIU/L-ACNC: 4.16 UIU/ML (ref 0.4–4.2)
VANCOMYCIN RESISTANT ENTEROCOCCUS: NEGATIVE
WBC # BLD: 12.2 THOU/MM3 (ref 4.8–10.8)

## 2020-07-22 PROCEDURE — 99255 IP/OBS CONSLTJ NEW/EST HI 80: CPT | Performed by: INTERNAL MEDICINE

## 2020-07-22 PROCEDURE — 84443 ASSAY THYROID STIM HORMONE: CPT

## 2020-07-22 PROCEDURE — 2000000000 HC ICU R&B

## 2020-07-22 PROCEDURE — 6370000000 HC RX 637 (ALT 250 FOR IP): Performed by: FAMILY MEDICINE

## 2020-07-22 PROCEDURE — 87641 MR-STAPH DNA AMP PROBE: CPT

## 2020-07-22 PROCEDURE — C1751 CATH, INF, PER/CENT/MIDLINE: HCPCS

## 2020-07-22 PROCEDURE — 71046 X-RAY EXAM CHEST 2 VIEWS: CPT

## 2020-07-22 PROCEDURE — 36415 COLL VENOUS BLD VENIPUNCTURE: CPT

## 2020-07-22 PROCEDURE — 6360000004 HC RX CONTRAST MEDICATION: Performed by: FAMILY MEDICINE

## 2020-07-22 PROCEDURE — 99223 1ST HOSP IP/OBS HIGH 75: CPT | Performed by: PSYCHIATRY & NEUROLOGY

## 2020-07-22 PROCEDURE — 71045 X-RAY EXAM CHEST 1 VIEW: CPT

## 2020-07-22 PROCEDURE — 70553 MRI BRAIN STEM W/O & W/DYE: CPT

## 2020-07-22 PROCEDURE — 82436 ASSAY OF URINE CHLORIDE: CPT

## 2020-07-22 PROCEDURE — 80053 COMPREHEN METABOLIC PANEL: CPT

## 2020-07-22 PROCEDURE — 6370000000 HC RX 637 (ALT 250 FOR IP): Performed by: INTERNAL MEDICINE

## 2020-07-22 PROCEDURE — 84295 ASSAY OF SERUM SODIUM: CPT

## 2020-07-22 PROCEDURE — 2580000003 HC RX 258: Performed by: INTERNAL MEDICINE

## 2020-07-22 PROCEDURE — 82330 ASSAY OF CALCIUM: CPT

## 2020-07-22 PROCEDURE — 2580000003 HC RX 258: Performed by: FAMILY MEDICINE

## 2020-07-22 PROCEDURE — 83970 ASSAY OF PARATHORMONE: CPT

## 2020-07-22 PROCEDURE — 84100 ASSAY OF PHOSPHORUS: CPT

## 2020-07-22 PROCEDURE — 82948 REAGENT STRIP/BLOOD GLUCOSE: CPT

## 2020-07-22 PROCEDURE — 87500 VANOMYCIN DNA AMP PROBE: CPT

## 2020-07-22 PROCEDURE — 2709999900 HC NON-CHARGEABLE SUPPLY

## 2020-07-22 PROCEDURE — 02HV33Z INSERTION OF INFUSION DEVICE INTO SUPERIOR VENA CAVA, PERCUTANEOUS APPROACH: ICD-10-PCS | Performed by: INTERNAL MEDICINE

## 2020-07-22 PROCEDURE — 83935 ASSAY OF URINE OSMOLALITY: CPT

## 2020-07-22 PROCEDURE — 2500000003 HC RX 250 WO HCPCS: Performed by: INTERNAL MEDICINE

## 2020-07-22 PROCEDURE — 83735 ASSAY OF MAGNESIUM: CPT

## 2020-07-22 PROCEDURE — 6360000002 HC RX W HCPCS: Performed by: FAMILY MEDICINE

## 2020-07-22 PROCEDURE — 87081 CULTURE SCREEN ONLY: CPT

## 2020-07-22 PROCEDURE — 84156 ASSAY OF PROTEIN URINE: CPT

## 2020-07-22 PROCEDURE — 99999 PR OFFICE/OUTPT VISIT,PROCEDURE ONLY: CPT | Performed by: NURSE PRACTITIONER

## 2020-07-22 PROCEDURE — 83930 ASSAY OF BLOOD OSMOLALITY: CPT

## 2020-07-22 PROCEDURE — 99222 1ST HOSP IP/OBS MODERATE 55: CPT | Performed by: INTERNAL MEDICINE

## 2020-07-22 PROCEDURE — 84300 ASSAY OF URINE SODIUM: CPT

## 2020-07-22 PROCEDURE — 85025 COMPLETE CBC W/AUTO DIFF WBC: CPT

## 2020-07-22 PROCEDURE — 82570 ASSAY OF URINE CREATININE: CPT

## 2020-07-22 PROCEDURE — B030Y0Z MAGNETIC RESONANCE IMAGING (MRI) OF BRAIN USING OTHER CONTRAST, UNENHANCED AND ENHANCED: ICD-10-PCS | Performed by: FAMILY MEDICINE

## 2020-07-22 PROCEDURE — A9579 GAD-BASE MR CONTRAST NOS,1ML: HCPCS | Performed by: FAMILY MEDICINE

## 2020-07-22 PROCEDURE — 94640 AIRWAY INHALATION TREATMENT: CPT

## 2020-07-22 PROCEDURE — 36556 INSERT NON-TUNNEL CV CATH: CPT

## 2020-07-22 PROCEDURE — 99232 SBSQ HOSP IP/OBS MODERATE 35: CPT | Performed by: INTERNAL MEDICINE

## 2020-07-22 PROCEDURE — 94760 N-INVAS EAR/PLS OXIMETRY 1: CPT

## 2020-07-22 PROCEDURE — 36592 COLLECT BLOOD FROM PICC: CPT

## 2020-07-22 RX ORDER — SODIUM CHLORIDE 1000 MG
2 TABLET, SOLUBLE MISCELLANEOUS
Status: DISCONTINUED | OUTPATIENT
Start: 2020-07-22 | End: 2020-07-24

## 2020-07-22 RX ORDER — 3% SODIUM CHLORIDE 3 G/100ML
25 INJECTION, SOLUTION INTRAVENOUS CONTINUOUS
Status: DISCONTINUED | OUTPATIENT
Start: 2020-07-22 | End: 2020-07-23

## 2020-07-22 RX ORDER — METOPROLOL TARTRATE 5 MG/5ML
5 INJECTION INTRAVENOUS EVERY 6 HOURS PRN
Status: DISCONTINUED | OUTPATIENT
Start: 2020-07-22 | End: 2020-07-26 | Stop reason: HOSPADM

## 2020-07-22 RX ORDER — MAGNESIUM SULFATE IN WATER 40 MG/ML
2 INJECTION, SOLUTION INTRAVENOUS ONCE
Status: COMPLETED | OUTPATIENT
Start: 2020-07-22 | End: 2020-07-22

## 2020-07-22 RX ORDER — DEXTROSE MONOHYDRATE 50 MG/ML
100 INJECTION, SOLUTION INTRAVENOUS PRN
Status: DISCONTINUED | OUTPATIENT
Start: 2020-07-22 | End: 2020-07-26 | Stop reason: HOSPADM

## 2020-07-22 RX ORDER — DEXTROSE MONOHYDRATE 25 G/50ML
12.5 INJECTION, SOLUTION INTRAVENOUS PRN
Status: DISCONTINUED | OUTPATIENT
Start: 2020-07-22 | End: 2020-07-26 | Stop reason: HOSPADM

## 2020-07-22 RX ORDER — NICOTINE POLACRILEX 4 MG
15 LOZENGE BUCCAL PRN
Status: DISCONTINUED | OUTPATIENT
Start: 2020-07-22 | End: 2020-07-26 | Stop reason: HOSPADM

## 2020-07-22 RX ORDER — SODIUM CHLORIDE 1000 MG
1 TABLET, SOLUBLE MISCELLANEOUS
Status: DISCONTINUED | OUTPATIENT
Start: 2020-07-22 | End: 2020-07-22

## 2020-07-22 RX ORDER — 3% SODIUM CHLORIDE 3 G/100ML
25 INJECTION, SOLUTION INTRAVENOUS CONTINUOUS
Status: DISPENSED | OUTPATIENT
Start: 2020-07-22 | End: 2020-07-22

## 2020-07-22 RX ORDER — MAGNESIUM SULFATE IN WATER 40 MG/ML
2 INJECTION, SOLUTION INTRAVENOUS ONCE
Status: DISCONTINUED | OUTPATIENT
Start: 2020-07-22 | End: 2020-07-22

## 2020-07-22 RX ADMIN — SODIUM CHLORIDE, PRESERVATIVE FREE 10 ML: 5 INJECTION INTRAVENOUS at 21:10

## 2020-07-22 RX ADMIN — Medication 10 ML: at 00:22

## 2020-07-22 RX ADMIN — PROMETHAZINE HYDROCHLORIDE 12.5 MG: 25 TABLET ORAL at 00:53

## 2020-07-22 RX ADMIN — SODIUM CHLORIDE, PRESERVATIVE FREE 10 ML: 5 INJECTION INTRAVENOUS at 00:23

## 2020-07-22 RX ADMIN — SODIUM CHLORIDE TAB 1 GM 2 G: 1 TAB at 18:32

## 2020-07-22 RX ADMIN — FLUTICASONE PROPIONATE 1 SPRAY: 50 SPRAY, METERED NASAL at 08:39

## 2020-07-22 RX ADMIN — ALLOPURINOL 300 MG: 300 TABLET ORAL at 08:39

## 2020-07-22 RX ADMIN — LISINOPRIL 20 MG: 20 TABLET ORAL at 08:39

## 2020-07-22 RX ADMIN — TAMSULOSIN HYDROCHLORIDE 0.4 MG: 0.4 CAPSULE ORAL at 00:22

## 2020-07-22 RX ADMIN — MAGNESIUM SULFATE HEPTAHYDRATE 2 G: 40 INJECTION, SOLUTION INTRAVENOUS at 07:51

## 2020-07-22 RX ADMIN — METOPROLOL TARTRATE 5 MG: 5 INJECTION INTRAVENOUS at 21:31

## 2020-07-22 RX ADMIN — FINASTERIDE 5 MG: 5 TABLET, FILM COATED ORAL at 08:39

## 2020-07-22 RX ADMIN — BUDESONIDE AND FORMOTEROL FUMARATE DIHYDRATE 2 PUFF: 160; 4.5 AEROSOL RESPIRATORY (INHALATION) at 08:03

## 2020-07-22 RX ADMIN — SODIUM CHLORIDE, PRESERVATIVE FREE 10 ML: 5 INJECTION INTRAVENOUS at 08:39

## 2020-07-22 RX ADMIN — GADOTERIDOL 20 ML: 279.3 INJECTION, SOLUTION INTRAVENOUS at 10:34

## 2020-07-22 RX ADMIN — ONDANSETRON 4 MG: 2 INJECTION INTRAMUSCULAR; INTRAVENOUS at 11:36

## 2020-07-22 RX ADMIN — INSULIN LISPRO 2 UNITS: 100 INJECTION, SOLUTION INTRAVENOUS; SUBCUTANEOUS at 11:55

## 2020-07-22 RX ADMIN — ACETAMINOPHEN 650 MG: 325 TABLET ORAL at 18:32

## 2020-07-22 RX ADMIN — SODIUM CHLORIDE TAB 1 GM 2 G: 1 TAB at 11:59

## 2020-07-22 RX ADMIN — TAMSULOSIN HYDROCHLORIDE 0.4 MG: 0.4 CAPSULE ORAL at 21:09

## 2020-07-22 RX ADMIN — INSULIN LISPRO 5 UNITS: 100 INJECTION, SOLUTION INTRAVENOUS; SUBCUTANEOUS at 18:39

## 2020-07-22 RX ADMIN — SODIUM CHLORIDE 25 ML/HR: 3 INJECTION, SOLUTION INTRAVENOUS at 18:04

## 2020-07-22 RX ADMIN — ATORVASTATIN CALCIUM 10 MG: 10 TABLET, FILM COATED ORAL at 21:09

## 2020-07-22 RX ADMIN — SODIUM CHLORIDE 1000 ML: 9 INJECTION, SOLUTION INTRAVENOUS at 00:22

## 2020-07-22 RX ADMIN — ATORVASTATIN CALCIUM 10 MG: 10 TABLET, FILM COATED ORAL at 00:22

## 2020-07-22 RX ADMIN — ACETAMINOPHEN 650 MG: 325 TABLET ORAL at 00:53

## 2020-07-22 RX ADMIN — SODIUM CHLORIDE: 9 INJECTION, SOLUTION INTRAVENOUS at 01:37

## 2020-07-22 ASSESSMENT — PAIN DESCRIPTION - PROGRESSION

## 2020-07-22 ASSESSMENT — PAIN DESCRIPTION - PAIN TYPE
TYPE: ACUTE PAIN
TYPE: ACUTE PAIN

## 2020-07-22 ASSESSMENT — PAIN DESCRIPTION - DESCRIPTORS
DESCRIPTORS: DULL;PRESSURE
DESCRIPTORS: THROBBING

## 2020-07-22 ASSESSMENT — PAIN SCALES - GENERAL
PAINLEVEL_OUTOF10: 6
PAINLEVEL_OUTOF10: 5
PAINLEVEL_OUTOF10: 0
PAINLEVEL_OUTOF10: 5

## 2020-07-22 ASSESSMENT — PAIN - FUNCTIONAL ASSESSMENT: PAIN_FUNCTIONAL_ASSESSMENT: PREVENTS OR INTERFERES SOME ACTIVE ACTIVITIES AND ADLS

## 2020-07-22 ASSESSMENT — PAIN DESCRIPTION - FREQUENCY
FREQUENCY: CONTINUOUS
FREQUENCY: CONTINUOUS

## 2020-07-22 ASSESSMENT — PAIN DESCRIPTION - ORIENTATION
ORIENTATION: ANTERIOR
ORIENTATION: ANTERIOR

## 2020-07-22 ASSESSMENT — PAIN DESCRIPTION - LOCATION
LOCATION: HEAD
LOCATION: HEAD

## 2020-07-22 ASSESSMENT — PAIN DESCRIPTION - ONSET: ONSET: ON-GOING

## 2020-07-22 NOTE — PROGRESS NOTES
Patient admitted to 4A Room 14 via ambulance from [address] upon arrival to the room: Headache  IV site free of s/s of infection or infiltration. Vital signs obtained. Assessment and data collection initiated. Oriented to room. Policies and procedures for 4a explained All questions answered with no further questions at this time. Fall prevention and safety brochure discussed with patient. 2 person skin check completed. Patient requests PCP notification. PCP notification will be completed in the morning. Patient declines family notification.

## 2020-07-22 NOTE — PROGRESS NOTES
This RN spoke with Dr. Enrique Anderson about patient's condition. This RN informed Dr. Enrique Anderson patient was educated about strict fluid restriction and I&O's. This RN filled patient's water pitcher with ice water and informed him it cannot be refilled until supper time. Patient was assisted to the bathroom with the tech and patient filled his water pitcher while in the restroom to an unknown amount. Patient drank water and returned to bed. [employer] notified this RN.

## 2020-07-22 NOTE — PROGRESS NOTES
1700: This RN and Dr. Laurence Fothergill explained to patient and his niece Suresh Miller the plan to transfer to ICU. Both verbalized understanding. 1729: Report called to SUDHIR Matos in ICU. Notified Shawna of patient going to x-ray then will go to  following radiology. 1740: Patient transported at this time to radiology by patient transport for CXR. Radiology aware to transport patient to ICU following imaging. Patient's belongings sent with his niece Suresh Miller including cell phone, wallet, and checkbook.

## 2020-07-22 NOTE — CONSULTS
[facility]    [address]  [facility]  7/22/2020 4:06 PM    Pt Name:    Tangela Martinez  MRN:     [id number]   [id number]  YOB: 1960  Admit Date:    7/21/2020  3:24 PM  Primary Care Physician:  Juan David Douglas DO    CSN Number:   [id number]    Reason for Consult:  Hyponatremia  Requesting provider:  Dr. Trav Lou    History:   The patient is a 60 y.o. pleasant white male with history of hypertension, diabetes mellitus who presented with complaints of headaches described as a 9 out of 10 bilateral, frontal area with some pain behind his eyes associated with some mild nausea. He states that his symptoms started earlier yesterday and woke him up from sleep. Denies any alleviating or any aggravating factors. He went to the emergency room where he was evaluated. In the emergency room patient was noted to be hypertensive. He had normal renal function but was noted to have sodium level of 127. He underwent CT of the head without contrast as well as CTA of the head with and without contrast which revealed some enlargement of the suprasellar cistern and pituitary gland measuring 1.6 cm concerning for pituitary lesion and possible adenoma. Patient was admitted to [facility] under hospitalist services. He was given a MRI which showed prominent sellar mass favoring macroadenoma. Patient reports improvement in his symptoms but still has headaches and some eye pain. He is not a very good historian. He appears somewhat disoriented and confused. He was given several boluses of normal saline overnight. His serum sodium today slowly started to worsen and went down to 119. Nephrology was consulted for further evaluation management. Despite putting him on fluid restriction patient apparently has been very noncompliant with fluid restrictions. He has been drinking a lot of water.   There is some concern that he is going to the bathroom and getting water from the sink. As I was evaluating him his repeat sodium level came down to 117. Orders have been placed for him to be started on 3% saline. Patient denies any history of any seizure disorder. Denies any history of any antidepressants. Denies any diarrhea or any use of diuretics. Patient denies any alcohol use. Past Medical History:  Past Medical History:   Diagnosis Date    Asthma     Diabetes mellitus ([facility] [address]     Gout        Past Surgical History:  Past Surgical History:   Procedure Laterality Date    CARPAL TUNNEL RELEASE Bilateral        Family History:  History reviewed. No pertinent family history.     Social History:  Social History     Socioeconomic History    Marital status: Single     Spouse name: Not on file    Number of children: Not on file    Years of education: Not on file    Highest education level: Not on file   Occupational History    Not on file   Social Needs    Financial resource strain: Not on file    Food insecurity     Worry: Not on file     Inability: Not on file    Transportation needs     Medical: Not on file     Non-medical: Not on file   Tobacco Use    Smoking status: Never Smoker    Smokeless tobacco: Never Used   Substance and Sexual Activity    Alcohol use: Not Currently    Drug use: Not Currently    Sexual activity: Not on file   Lifestyle    Physical activity     Days per week: Not on file     Minutes per session: Not on file    Stress: Not on file   Relationships    Social connections     Talks on phone: Not on file     Gets together: Not on file     Attends Jain service: Not on file     Active member of club or organization: Not on file     Attends meetings of clubs or organizations: Not on file     Relationship status: Not on file    Intimate partner violence     Fear of current or ex partner: Not on file     Emotionally abused: Not on file     Physically abused: Not on file     Forced sexual activity: Not on file Other Topics Concern    Not on file   Social History Narrative    Not on file       Home Meds:  Prior to Admission medications    Medication Sig Start Date End Date Taking? Authorizing Provider   fluticasone (FLONASE) 50 MCG/ACT nasal spray 1 spray by Each Nostril route daily 1-2 sprays in each nostril daily    Historical Provider, MD   albuterol sulfate HFA (PROAIR HFA) 108 (90 Base) MCG/ACT inhaler Inhale 1 puff into the lungs every 4 hours as needed for Wheezing    Historical Provider, MD   finasteride (PROSCAR) 5 MG tablet Take 5 mg by mouth daily    Historical Provider, MD   tamsulosin (FLOMAX) 0.4 MG capsule Take 0.4 mg by mouth nightly    Historical Provider, MD   benazepril (LOTENSIN) 20 MG tablet Take 20 mg by mouth daily    Historical Provider, MD   allopurinol (ZYLOPRIM) 300 MG tablet Take 300 mg by mouth daily    Historical Provider, MD   simvastatin (ZOCOR) 20 MG tablet Take 20 mg by mouth nightly    Historical Provider, MD   glimepiride (AMARYL) 4 MG tablet Take 4 mg by mouth every morning (before breakfast)    Historical Provider, MD   metFORMIN (GLUCOPHAGE) 1000 MG tablet Take 500 mg by mouth 2 times daily (with meals)    Historical Provider, MD   Fluticasone furoate-vilanterol (BREO ELLIPTA) 200-25 MCG/INH AEPB inhaler Inhale 1 puff into the lungs daily 3/24/20   ADRY Herrera - CNP       Review of Systems:  Constitutional: Positive for headaches associated with some eye pain  Eyes: Reports some pain behind his eyes, denies any blurry vision  Ears: Negative for ear pain or hearing changes  Nose: Negative for runny nose or epistaxis  Respiratory: Negative for shortness of breath. Negative for cough or sputum production. Negative for hemoptysis  Cardiovascular: Negative for chest pain  GI: Reports some nausea without any vomiting and diarrhea.   Negative for hematochezia and melena  : Negative for discharge, dysuria, or hematuria  Skin: Negative for rash  Musculoskeletal: Negative for joint pain, moves all ext  Neuro: Negative for numbness or tingling, negative for slurred speech  All other review of systems were reviewed and negative    Current Meds:  Infusion:    dextrose      sodium chloride       Meds:    insulin lispro  0-6 Units Subcutaneous Q6H    sodium chloride  2 g Oral TID WC    allopurinol  300 mg Oral Daily    lisinopril  20 mg Oral Daily    finasteride  5 mg Oral Daily    fluticasone  1 spray Each Nostril Daily    budesonide-formoterol  2 puff Inhalation BID    atorvastatin  10 mg Oral Nightly    tamsulosin  0.4 mg Oral Nightly    sodium chloride flush  10 mL Intravenous 2 times per day    [Held by provider] enoxaparin  40 mg Subcutaneous Daily     Meds prn: glucose, dextrose, glucagon (rDNA), dextrose, metoprolol, sodium chloride flush, acetaminophen **OR** acetaminophen, polyethylene glycol, promethazine **OR** ondansetron     Allergies/Intolerances: ALLERGIES: Patient has no known allergies. 24HR INTAKE/OUTPUT:      Intake/Output Summary (Last 24 hours) at 7/22/2020 1606  Last data filed at 7/22/2020 1523  Gross per 24 hour   Intake 2166.47 ml   Output 800 ml   Net 1366.47 ml     I/O last 3 completed shifts: In: 1395.9 [P.O.:120; I.V.:1275.9]  Out: -   I/O this shift: In: 770.6 [P.O.:650; I.V.:120.6]  Out: 800 [Urine:800]  Admission weight: 285 lb (129.3 kg)  Wt Readings from Last 3 Encounters:   07/22/20 294 lb 14.4 oz (133.8 kg)     Body mass index is 40 kg/m².     Physical Examination:  VITALS:   Vitals:    07/22/20 0453 07/22/20 0804 07/22/20 0835 07/22/20 1225   BP: 135/70  (!) 143/81 (!) 173/92   Pulse: 62  72 88   Resp: 16  18 18   Temp: 97.7 °F (36.5 °C)  99 °F (37.2 °C) 97.6 °F (36.4 °C)   TempSrc: Oral  Oral Oral   SpO2: 99% 98% 94% 96%   Weight: 294 lb 14.4 oz (133.8 kg)      Height:         Weight:   Wt Readings from Last 3 Encounters:   07/22/20 294 lb 14.4 oz (133.8 kg)     Constitutional and General Appearance: alert and cooperative with exam, appears comfortable, no distress, not diaphoretic, awake and alert but does not appear to be fully oriented  Eyes: no icteric sclera in left eye or right eye,  no pallor conjunctiva in left or right eye, no discharge seen from left eye or right eye  Ears and Nose: normal external appearance of left and right ear. Both ear lobules are nontender to palpation. Normal external appearance of nose. No active drainage from nose. Oral: moist oral mucus membranes  Neck: No jugular venous distention, appears symmetric, good ROM  Lungs: Air entry B/L, no crackles or rales, no use of accessory muscles or labored breathing  Chest: No chest wall tenderness  Heart: regular rate, S1, S2  Extremities: No LE edema, no tenderness  GI: soft, non-tender, no guarding, no distention  Skin: no rash seen on exposed extremities, warm to touch  Musculo: moves all extremities, no clubbing or cyanosis of digits of either upper extremity. Neuro: no slurred speech, no facial drooping  Psychiatric: Somewhat anxious    Lab Data  CBC:   Recent Labs     07/21/20  1630 07/22/20  0339   WBC 6.7 12.2*   HGB 13.9* 13.5*   HCT 40.6* 37.5*    183     BMP:  Recent Labs     07/21/20  1630 07/22/20  0339 07/22/20  1050 07/22/20  1435   * 122* 119* 117*   K 4.4 4.4  --   --    CL 94* 90*  --   --    CO2 26 20*  --   --    BUN 14 10  --   --    CREATININE 1.1 0.6  --   --    GLUCOSE 279* 210*  --   --    CALCIUM  --  7.7*  --   --    MG  --  1.4*  --   --      PTH: @PTH@  TSH:   Recent Labs     07/22/20  1050   TSH 4.160     HgBa1c: No results for input(s): LABA1C in the last 72 hours. Hepatic:   Recent Labs     07/22/20  0339   LABALBU 3.5   AST 23   ALT 20   BILITOT 0.7   ALKPHOS 53     ABGs: No results found for: PHART, PO2ART, AWD2JJV  Troponin:   Recent Labs     07/21/20  1630   TROPONINI 0.018     BNP: No results for input(s): BNP in the last 72 hours.     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 Moderate to severe sinusitis    Impression and Plan:  1. Acute hyponatremia. Patient serum sodium level was 127 yesterday and today it has dropped down to 117. Initial urine electrolytes on admission suggested SIADH pathology. His urine osmolality was 784 and urine sodium was 172. Since admission apparently patient has been drinking a lot of water. He has not been following recommendations. There was some concern that he was apparently getting water from the bathroom. I think this also explains the drop in urine sodium today. Anyway because his serum sodium has dropped from 127 to 117 acutely given some disorientation patient needs to have quick correction. Patient will need to be started on 3% saline. I have placed orders. Discussed with nursing staff and primary services. Patient will need to be fluid restricted. Will also place him on salt tablets. 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 We will also check chest x-ray for full work-up. Patient will need sodium checked every 2 hours while on 3% saline drip  2. Hypomagnesemia. Will replace with 2 g magnesium IV sulfate  3. Hypertension. Blood pressure readings reviewed  4. Pituitary mass. Awaiting further evaluation and management by neurosurgery  5. Diabetes mellitus with minimal proteinuria. Continue with lisinopril    Discussed with hospitalist services. Discussed with nursing staff. If 3% saline cannot be administered on 4A  then patient will need to go to ICU    Thank you for the consult. Please feel free to call me if you have any questions.      Raymundo Castillo MD  [facility]

## 2020-07-22 NOTE — CONSULTS
CRITICAL CARE PROGRESS NOTE      Patient:  Janak Garcia    Unit/Bed:4D-17/017-A  YOB: 1960  MRN: [id number]   PCP: Jarret Alfaro DO  Date of Admission: 7/21/2020  Chief Complaint:- hyponatremia    Assessment and Plan:    1. Acute hyponatremia: Noted 10 point drop of sodium in the last 24 hours. Nursing had reported patient had been drinking a lot of free water. Patient was transferred to the ICU for 3% saline patient will continue with sodium levels every 2 hours and monitor. Nephrology is to follow. INITIAL H AND P AND ICU COURSE:  Mary Barnhart is a 60year old  male transferred to [facility] 7/22/2020 for 3% normal saline administration. Patient has a significant history of lifetime nonsmoker, Asthma, DMT2, Gout    Ronn Shoulders was transferred to the ICU with acute drop in sodium level for 3% saline. Past Medical History:  See HPI. Family History: Mother and Father unknown  Social History:  Lifetime nonsmoker, Denies any ETOH or illicit drug use, Single. ROS   GENERAL: No recent change in weight, no fever,chills, night sweats, Positive for fatigue  SKN: No lesions or rashes.   HEAD: No headaches or recent injury  EYES: No acute changes in vision, no diplopia or blurred vision  EARS: No hearing loss, no tinnitus  NOSE/THROAT: No rhinorrhea or pharyngitis, no nasal drainage  NECK: No lumps or unusual neck stiffness  PULMONARY: No dyspnea, orthopnea or paroxysmal nocturnal dyspnea, stridor or wheezing  CARDIAC: No chest pain, pressure, venous or tightness  GI: No history melena or hematochezia, no diarrhea or constipation  PERIPHERAL VASCULAR: No intermittent claudication or unusual leg cramps  MUSCULOSKELETAL: Occasional arthralgias, myalgias  NEUROLOGICAL: Denies any headache dizziness  Seizures or Syncope  HEMATOLOGIC:  No unusual bruising or bleeding  PSYCH: Denies any homicidal or suicidal ideation    Scheduled Meds:   insulin lispro  0-6 Units Subcutaneous Q6H    sodium chloride  2 g Oral TID     allopurinol  300 mg Oral Daily    lisinopril  20 mg Oral Daily    finasteride  5 mg Oral Daily    fluticasone  1 spray Each Nostril Daily    budesonide-formoterol  2 puff Inhalation BID    atorvastatin  10 mg Oral Nightly    tamsulosin  0.4 mg Oral Nightly    sodium chloride flush  10 mL Intravenous 2 times per day    [Held by provider] enoxaparin  40 mg Subcutaneous Daily     Continuous Infusions:   dextrose      sodium chloride 25 mL/hr (07/22/20 1804)       PHYSICAL EXAMINATION:  T: 100.6. P: 77. RR: 20. B/P: 159/88. FiO2: Room air. O2 Sat: 96.  I/O: Pending  Body mass index is 40.07 kg/m². GCS:   15  General:   Flushed ill in appearance  HEENT:  normocephalic and atraumatic. No scleral icterus. PERR  Neck: supple. No Thyromegaly. Lungs: clear to auscultation. No retractions  Cardiac: S1-S2. No JVD. Abdomen: soft. Nontender. Extremities:  No clubbing, cyanosis, or edema x 4. Vasculature: capillary refill < 3 seconds. Palpable dorsalis pedis pulses. Skin:  warm and dry. Psych:  Alert and oriented x3. Affect appropriate  Lymph:  No supraclavicular adenopathy. Neurologic:  No focal deficit. No seizures. Data: (All radiographs, tracings, PFTs, and imaging are personally viewed and interpreted unless otherwise noted).  Sodium [phone]        Seen with multidisciplinary ICU team yes. Meets Continued ICU Level Care Criteria:    [x] Yes   [] No - Transfer Planned to listed location:  [] HOSPITALIST CONTACTED-      Case and plan discussed with Dr. Robson Velasco. Electronically signed by ADRY Gan - CNP  CRITICAL CARE SPECIALIST  Patient seen by me. Case discussed with Dr. Sally Jarquin.  Case discussed with nurse practitioner. Patient noncompliant with fluid restriction. Currently on hypertonic saline for hyponatremia. Electronically signed by Princess Velasco MD.

## 2020-07-22 NOTE — PLAN OF CARE
Problem: Pain:  Goal: Pain level will decrease  Description: Patient complained of pain in head rating it a 6 on the 0-10 scale. Pain medication given as ordered and needed. Patient voiced satisfaction with this. Also instructed patient on distraction, repositioning, and ambulation as non-pharmacological methods of pain relief. Patient voiced understanding.     7/22/2020 0457 by Ladan Min RN  Outcome: Ongoing  7/22/2020 0348 by Ladan Min RN  Outcome: Ongoing  Note: Patient complained of pain in head/eyes rating it a 7 on the 0-10 scale. Pain medication given as ordered and needed. Patient voiced satisfaction with this. Also instructed patient on distraction, repositioning, and ambulation as non-pharmacological methods of pain relief. Patient voiced understanding. Goal: Control of acute pain  Description: Patient complained of pain in head rating it a 6 on the 0-10 scale. Pain medication given as ordered and needed. Patient voiced satisfaction with this. Also instructed patient on distraction, repositioning, and ambulation as non-pharmacological methods of pain relief. Patient voiced understanding.     7/22/2020 0457 by Ladan Min RN  Outcome: Ongoing  7/22/2020 0348 by Ladan Min RN  Outcome: Ongoing  Note: Patient expressed pain level to be a 7 on a 1-10 scale. Patient recognizes the difference between acute pain and chronic pain. Problem: Nausea/Vomiting:  Goal: Absence of nausea/vomiting  Description: Patient complaining of nausea and dry-heaving. Phenergan given PO.   7/22/2020 0457 by Ladan Min RN  Outcome: Ongoing  7/22/2020 0456 by Ladan Min RN  Note: Patient complaining of nausea and dry-heaving. Phenergan given PO. Problem: Skin Integrity:  Goal: Absence of new skin breakdown  Description: Patient turns self. Reminded patient to turn every two hours.    7/22/2020 0457 by Ladan Min RN  Outcome: Ongoing  7/22/2020 0457 by Ladan Min RN  Note: Patient turns self. Reminded patient to turn every two hours.       Problem: Physical Regulation:  Goal: Will show no signs and symptoms of electrolyte imbalance  Description: Results for Mary Ahr (MRN [id number]) as of 7/22/2020 04:51    7/22/2020 03:39  Sodium: 122 (L)    Outcome: Ongoing  Note: Results for Mary Ahr (MRN [id number]) as of 7/22/2020 04:51    7/22/2020 03:39  Sodium: 122 (L)

## 2020-07-22 NOTE — CONSULTS
NEUROLOGY CONSULT NOTE      Requesting Physician: Louis Turcios MD    Reason for Consult:  Evaluate for headache, pituitary lesion possible macroadenoma    History of Present Illness:  Junie Stevens is a 60 y.o. male admitted to [address] [place] [address] on 7/21/2020. He presented with complaints of headaches described as a 9 out of 10 bilateral, frontal area with some pain behind his eyes associated with some mild nausea. He describes his pain as pressure. He states that his symptoms started earlier yesterday and woke him up from sleep. Symptoms were severe and constant. Onset was sudden. Modifiers are known. Frequency is once, he denies any history of similar symptoms in the past.  Duration of symptoms is ongoing. Denies any alleviating or any aggravating factors. He went to the emergency room where he was evaluated. In the emergency room patient was noted to be hypertensive. He had normal renal function but was noted to have sodium level of 127. He underwent CT of the head without contrast as well as CTA of the head with and without contrast which revealed some enlargement of the suprasellar cistern and pituitary gland measuring 1.6 cm concerning for pituitary lesion and possible adenoma. Patient was admitted to [facility] under hospitalist services. He was given a MRI which showed prominent sellar mass favoring macroadenoma. Reports no chest pain. No shortness of breath with exertion. Reports no neck pain. No vision changes. No dysphagia. No fever. No rash. No weight loss. History provided by patient as well as review of the medical record.     Past Medical History:        Diagnosis Date    Asthma     Diabetes mellitus ([address]     Gout            Procedure Laterality Date    CARPAL TUNNEL RELEASE Bilateral        Allergies:    No Known Allergies     Current Medications:   glucose (GLUTOSE) 40 % oral gel 15 g, PRN  dextrose 50 % IV solution, PRN  glucagon (rDNA) injection 1 mg, PRN  dextrose 5 % solution, PRN  insulin lispro (HUMALOG) injection vial 0-6 Units, Q6H  sodium chloride tablet 2 g, TID WC  metoprolol (LOPRESSOR) injection 5 mg, Q6H PRN  sodium chloride 3 % solution, Continuous  magnesium sulfate 2 g in 50 mL IVPB premix, Once  allopurinol (ZYLOPRIM) tablet 300 mg, Daily  lisinopril (PRINIVIL;ZESTRIL) tablet 20 mg, Daily  finasteride (PROSCAR) tablet 5 mg, Daily  fluticasone (FLONASE) 50 MCG/ACT nasal spray 1 spray, Daily  budesonide-formoterol (SYMBICORT) 160-4.5 MCG/ACT inhaler 2 puff, BID  atorvastatin (LIPITOR) tablet 10 mg, Nightly  tamsulosin (FLOMAX) capsule 0.4 mg, Nightly  sodium chloride flush 0.9 % injection 10 mL, 2 times per day  sodium chloride flush 0.9 % injection 10 mL, PRN  acetaminophen (TYLENOL) tablet 650 mg, Q6H PRN    Or  acetaminophen (TYLENOL) suppository 650 mg, Q6H PRN  polyethylene glycol (GLYCOLAX) packet 17 g, Daily PRN  promethazine (PHENERGAN) tablet 12.5 mg, Q6H PRN    Or  ondansetron (ZOFRAN) injection 4 mg, Q6H PRN  [Held by provider] enoxaparin (LOVENOX) injection 40 mg, Daily         Social History:  Social History     Tobacco Use   Smoking Status Never Smoker   Smokeless Tobacco Never Used     Social History     Substance and Sexual Activity   Alcohol Use Not Currently     Social History     Substance and Sexual Activity   Drug Use Not Currently     Single    Family History:   History reviewed. No pertinent family history. Review of Systems:  All systems reviewed and are all negative except what is mentioned in history of present illness. Physical Exam:  BP (!) 173/92   Pulse 88   Temp 97.6 °F (36.4 °C) (Oral)   Resp 18   Ht 6' (1.829 m)   Wt 294 lb 14.4 oz (133.8 kg)   SpO2 96%   BMI 40.00 kg/m²  I Body mass index is 40 kg/m².  I   Wt Readings from Last 1 Encounters:   07/22/20 294 lb 14.4 oz (133.8 kg)           General appearance - alert, well appearing, and in no distress, oriented to  person, place, and time and over weight, he is laying in bed  Mental status -Level of Alertness: awake  Orientation: person, place, time  Memory: normal  Fund of Knowledge: normal  Attention/Concentration: normal  Language: normal. Mood is flat  Neck - supple, no neck adenopathy, carotids upstroke normal bilaterally, no carotid bruits. There is no LAP in the neck. There is no thyroid enlargement. Neurological -   Cranial Ywoadm-TM-RFT:   Cranial nerve II: Normal. There is full visual fields, he has intact visual fields. Cranial nerve III: Pupils: equal, round, reactive to light   Cranial nerves III, IV, VI: Extraocular Movements: intact   Cranial nerve V: Facial sensation: intact   Cranial nerve VII:Facial strength: intact   Cranial nerve VIII: Hearing: intact   Cranial nerve IX: Palate Elevation intact bilaterally  Cranial nerve XI: Shoulder shrug intact bilaterally  Cranial nerve XII: Tongue midline   neck supple without rigidity  DTR's are decreased distal and symmetric  Babinski sign is negative on bilaterally. Motor exam is 5/5 in the upper and lower extremities  except. Normal muscle tone. There is no muscle atrophy. There is no muscle fasciculation    Sensory is intact forlight touch. Coordination: finger to nose intact  Gait and station not tested  Abnormal movement none. Long tracts are  deferred. Skin - no rashes or lesions, warm and dry to touch  Superficial temporal artery pulses are normal.   Musculoskeletal: Has no hand arthritis, no limitation of ROM in any of the four extremities. no joint tenderness, deformity or swelling. There is no leg edema. The Heart was regular in rate and rhythm. No heart murmur  Chest- Clear, good effort. Abdomen: soft, intact bowel sounds.         Labs:    CBC:   Recent Labs     07/21/20  1630 07/22/20  0339   WBC 6.7 12.2*   HGB 13.9* 13.5*    183   MCV 93.5 89.1   MCH 32.0* 32.1   MCHC 34.2 36.0*   RDW 13.9  --      CMP:  Recent Labs     07/21/20  1630 07/22/20  0339 07/22/20  1050 07/22/20  1435   * 122* 119* 117*   K 4.4 4.4  --   --    CL 94* 90*  --   --    CO2 26 20*  --   --    BUN 14 10  --   --    CREATININE 1.1 0.6  --   --    LABGLOM  --  >90  --   --    GLUCOSE 279* 210*  --   --    CALCIUM  --  7.7*  --   --      Liver:   Recent Labs     07/22/20  0339   AST 23   ALT 20   ALKPHOS 53   PROT 5.3*   LABALBU 3.5   BILITOT 0.7     Reviewed   Results for orders placed during the hospital encounter of 07/21/20   CTA HEAD W WO CONTRAST    Narrative PROCEDURE: CTA HEAD W WO CONTRAST    CLINICAL INFORMATION: Headache. COMPARISON: No prior study. TECHNIQUE: 1 mm CT axial images were obtained through the head after the fast bolus administration of contrast. A noncontrast localizer was obtained. 3-D reconstructions were performed on a dedicated 3-D workstation. These include multiplanar MPR images   and multiplanar MIP images. 80 cc Isovue 370 intravenous contrast were given. All CT scans at this facility use dose modulation, iterative reconstruction, and/or weight-based dosing when appropriate to reduce radiation dose to as low as reasonably achievable. FINDINGS:  There is enlargement of the suprasellar cistern and pituitary gland measuring 1.6 cm concerning for pituitary lesion possible macroadenoma. There is mild mass effect along the cavernous sinus on the left which may be contributing to patient's   symptoms. Contrast-enhanced MRI is advised for further evaluation. Orbits appear symmetric. There is near complete opacification of the left maxillary sinus extending into the ethmoid sinus with widening of the ostiomeatal unit. Bony remodeling suggesting chronic finding. Polyposis cannot be excluded. Correlation is advised. No hemodynamically significant stenosis or occlusion within the intracranial circulation at the centered at the [place].         Impression There is enlargement of the suprasellar cistern and pituitary gland measuring 1.6 cm concerning for pituitary lesion possible macroadenoma. There is mild mass effect along the cavernous sinus on the left which may be contributing to patient's   symptoms. Contrast-enhanced MRI is advised for further evaluation. Findings were discussed with Dr. Elías Centeno at 9:09 PM on 7/21/2020  Examination on the right by a neuroradiologist tomorrow. Please see the complete report. **This report has been created using voice recognition software. It may contain minor errors which are inherent in voice recognition technology. **    FINAL REPORT:    PROCEDURE: CTA HEAD W WO CONTRAST    CLINICAL INFORMATION: Headache. Worsening headache. Diaphoresis, confusion, hypertension. COMPARISON: CT head from the same date. TECHNIQUE: 1 mm CT axial images were obtained through the head during fast bolus administration of 100 mL Isovue-370 injected in the left AC. A noncontrast localizer was obtained. 3-D reconstructions were performed on a dedicated 3-D workstation. These   include multiplanar MPR images and multiplanar MIP images. All CT scans at this facility use dose modulation, iterative reconstruction, and/or weight-based dosing when appropriate to reduce radiation dose to as low as reasonably achievable. FINDINGS:   There is a sellar mass measuring 2.4 x 2 cm in greatest axial dimensions. This expands the sella with remodeling of the adjacent bone. There is some suprasellar extension though does not contact the anterior cerebral arteries. The lesion may involve the   left cavernous sinus. Intracranial segments of the internal carotid arteries are patent without focal stenosis or visualized aneurysm. The cavernous segment of left internal carotid artery is displaced laterally by the sellar mass. The bilateral middle cerebral and anterior   cerebral arteries are patent without focal abnormality identified. The basilar artery is patent without focal stenosis or visualized aneurysm.  The bilateral posterior cerebral arteries are patent without focal abnormality identified. Dural venous sinuses   appear patent without focal filling defect. No focal areas of abnormal parenchymal enhancement are identified. IMPRESSION:     1. Sellar mass with bony remodeling which may involve the left cavernous sinus and mildly displaces the cavernous segment of left internal carotid artery laterally. Recommend MRI of the sella with contrast for further evaluation. 2. No flow-limiting stenosis or aneurysm in the intracranial circulation. **This report has been created using voice recognition software. It may contain minor errors which are inherent in voice recognition technology. **    Final report electronically signed by Dr. Cat Lerma MD on 7/22/2020 9:22 AM      I reviewed the MRI brain and agree with interpretation. Results for orders placed during the hospital encounter of 07/21/20   MRI BRAIN W WO CONTRAST    Narrative PROCEDURE: MRI BRAIN W WO CONTRAST    INDICATION:Macroadenoma / concern for mild mass effect along the L cavernous sinus (see below). Mass seen on previous imaging. COMPARISON: CT and CTA head dated 7/21/2020. TECHNIQUE: Multiplanar and multiple spin echo T1 and T2-weighted images were obtained through the brain before and after the administration of intravenous contrast. Additional high-resolution sequences were performed through the pituitary gland. These   include coronal T2-weighted images and coronal and sagittal pre-and postcontrast T1-weighted images. 20 mL ProHance was injected in the existing IV site. FINDINGS:  Images are motion degraded. Redemonstration of a prominent sellar mass expanding the sella. The lesion is intermediate T1 and T2 signal with wispy areas of intrinsic hyperintense T1 signal. No definite enhancement is present. The lesion abuts the   bilateral cavernous sinuses and causes mass effect on the left cavernous sinus.  The lesions borders do not cross the medial intercarotid line. The lesion extends into the suprasellar cistern and abuts the optic chiasm. The right sphenoid sinus extends   posterior medially into the clivus subjacent to the sella with variation of the right clinoid process as well. The ventricles, cisterns and sulci are symmetric and normal in size and configuration. No other intra or extra-axial mass is identified. No significant focal areas of abnormal T2/flair prolongation are identified within the parenchyma. No focal areas of   restricted diffusion are present. No focal areas of abnormal parenchymal or meningeal enhancement are identified. The major vascular flow voids appear patent. Orbits are unremarkable. The left maxillary sinus is opacified with hyperintense T2 signal material. There is moderate mucosal thickening in the sphenoid sinuses and aerated clinoid process and clivus. There   is also moderate mucosal thickening in the ethmoid air cells. Mastoid air cells are clear. Impression    1. Redemonstration of prominent expansile sellar mass. 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 There is mild heterogeneous intrinsic hyperintense T1 signal within the lesion suggesting possible hemorrhagic component. This causes mass effect on the left cavernous   sinus without evidence of invasion. Superiorly the lesion contacts the optic chiasm. 2. Moderate to severe sinusitis. **This report has been created using voice recognition software. It may contain minor errors which are inherent in voice recognition technology. **      Final report electronically signed by Dr. Belén Alcocer MD on 7/22/2020 10:52 AM     Reviewed   Results for orders placed during the hospital encounter of 07/21/20   CT Head WO Contrast    Addendum ** ADDENDUM #1 **  Upon further evaluation there is a soft tissue approximately 1.5 cm which  is isodense to underlying brain parenchyma in the suprasellar space with  bony remodeling suggesting possible pituitary lesion.  Differential diagnosis would include pituitary  macroadenoma. Other etiologies are not excluded. Contrast-enhanced MRI is  advised. These findings and addendum were discussed with Dr. Jeanette Morley of at 9:13 PM  on 7/21/2020  Final report electronically signed by Dr. Lane Go on 7/21/2020 9:15 PM  * ORIGINAL REPORT ** PROCEDURE: CT HEAD WO CONTRAST  CLINICAL INFORMATION: fatigue. COMPARISON: No prior study. TECHNIQUE: Noncontrast 5 mm axial images were obtained through the brain. All CT scans at this facility use dose modulation, iterative  reconstruction, and/or weight-based dosing when appropriate to reduce  radiation dose to as low as reasonably achievable. FINDINGS:  No acute intracranial findings. Gray-white differentiation is preserved. No acute hemorrhage or midline shift. Ventricles are within normal limits for age. Near complete opacification of the left maxillary sinus and ethmoid and  sphenoid sinuses. Correlate for chronic sinus disease or polyposis. Mastoid air cells are patent. Skull: Unremarkable. Soft tissues: Unremarkable. Erasmo Valera MD 7/21/2020  9:17 PM          Impression No acute intracranial findings. Near complete opacification of the left maxillary sinus and ethmoid and sphenoid sinuses. Correlate for chronic sinus disease with bony remodeling or polyposis. Correlation with symptoms and follow-up advised. **This report has been created using voice recognition software. It may contain minor errors which are inherent in voice recognition technology. **    Final report electronically signed by Dr. Lane Go on 7/21/2020 4:03 PM         We reviewed the patient records and available information in the EHR       Impression:    Active Problems:    Hyponatremia    Pituitary adenoma ([address]    Other headache syndrome    Pituitary apoplexy ([address]  Resolved Problems:    * No resolved hospital problems. *  This is a 79-year-old male who presents with headache that woke him from sleep.   Location of his headache is behind his bilateral eyes. He denies any change in vision, he has intact visual fields and intact EOM to my exam. He underwent CT of the head, followed by MRI brain with and without contrast, showing prominent expansile sellar mass. 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 There is mild heterogeneous intrinsic hyperintense T1 signal within the lesion suggesting possible hemorrhagic component. This causes mass effect on the left cavernous sinus without evidence of invasion. This explains the patient headache reported. However at the time of my evaluation, he reports his headache was resolved. His exam is nonfocal.  After detailed discussion with patient we agreed on the following plan. Recommendations:                                              1. MRI brain W/WO contrast, reviewed agree with interpretation, there is suggestion of pituitary apoplexy. 2. Neurosurgery was consulted re above finding. 3. Nephorololgy consult re hyponatremia. 4. Close neurostatus and electrolyte monitoring. 5. DVT prophylaxis  6. Case was discussed with primary service. 7. All questions were answered. It was my pleasure to evaluate Day Arias today. Please call with questions.       Electronically signed by Marbella Corrales MD on 7/22/2020 at 4:53 PM

## 2020-07-22 NOTE — PLAN OF CARE
Problem: RESPIRATORY  Goal: Clear lung sounds  Description: Clear lung sounds  Outcome: Met This Shift  Note: Pt given Symbicort MDI to substitute his home med of Breo. Explanation and side effects given to pt. Pt had no questions. Pt takes inhaler well.

## 2020-07-22 NOTE — PROGRESS NOTES
Hospitalist Progress Note      Patient:  Roslyn Millard    Unit/Bed:4A-14/014-A  YOB: 1960  MRN: [id number]   Acct: [de-identified]   PCP: Yariel Gordon DO  Date of Admission: 7/21/2020    Assessment/Plan:    1. 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 Keep patient NPO till seen by neurosx. MRI this morning    -Neurology following.   -Pain control    2. Severe hyponatremia - ? Acute   -Check TSH and morning cortisol. Likely SIADH. Start patient on salt tablets and fluid restriction. Monitor sodium every 4. Nephrology consulted due to worsening sodium. 3.  Essential hypertension -uncontrolled   -Blood pressure much better this morning. Possibly due to resolved headache.   -Continue to monitor. Will add PRN beta-blocker for better blood pressure control. 4. DM II   - Hold oral agents. Continue ISS. Monitor BG and adjust accordingly. 5. Hypocalcemia/hypomag   - Replace Mag.    - Check PTH. Patient is asymptomatic so will not replace. Continue to monitor. 6. BPH  7. Gout    Patients sodium continues to fall. Discussed with nephro. Will transfer patient to ICU for 3% administration    Chief Complaint: Headache    Initial H and P:-      61 y/o M PMHx HTN, NIDDM2, BPH, obesity and gout -- presents to [facility] as a direct admission from [facility], where he initially presented with a chief complaint of headache.  History provided by the patient.     Patient presented to [facility] ED with complaints of headache which woke him from sleep 2 nights ago / described as severe (9/10) / aching / bilateral --  located behind both eyes without radiation / has persisted since initial onset / states he has never had a headache like this in the past. Denies associated vision changes (blurred / double vision), photophobia, neck pain/stiffness, nausea/emesis, weakness, paresthesias, gait instability/ataxia or dextrose, sodium chloride flush, acetaminophen **OR** acetaminophen, polyethylene glycol, promethazine **OR** ondansetron      Intake/Output Summary (Last 24 hours) at 7/22/2020 1418  Last data filed at 7/22/2020 0839  Gross per 24 hour   Intake 1275.9 ml   Output --   Net 1275.9 ml       Diet:  DIET CARB CONTROL; Daily Fluid Restriction: 1200 ml    Exam:  BP (!) 173/92   Pulse 88   Temp 97.6 °F (36.4 °C) (Oral)   Resp 18   Ht 6' (1.829 m)   Wt 294 lb 14.4 oz (133.8 kg)   SpO2 96%   BMI 40.00 kg/m²   General appearance: No apparent distress, appears stated age and cooperative. HEENT: Pupils equal, round, and reactive to light. Conjunctivae/corneas clear. Neck: Supple, with full range of motion. No jugular venous distention. Trachea midline. Respiratory:  Normal respiratory effort. Clear to auscultation, bilaterally without Rales/Wheezes/Rhonchi. Cardiovascular: Regular rate and rhythm with normal S1/S2 without murmurs, rubs or gallops. Abdomen: Soft, non-tender, non-distended with normal bowel sounds. Musculoskeletal: passive and active ROM x 4 extremities. Skin: Skin color, texture, turgor normal.  No rashes or lesions. Neurologic:  Neurovascularly intact without any focal sensory/motor deficits. Cranial nerves: II-XII intact, grossly non-focal.  Psychiatric: Alert and oriented, thought content appropriate, normal insight  Capillary Refill: Brisk,< 3 seconds   Peripheral Pulses: +2 palpable, equal bilaterally     Labs:   Recent Labs     07/21/20  1630 07/22/20 0339   WBC 6.7 12.2*   HGB 13.9* 13.5*   HCT 40.6* 37.5*    183     Recent Labs     07/21/20  1630 07/22/20  0339 07/22/20  1050   * 122* 119*   K 4.4 4.4  --    CL 94* 90*  --    CO2 26 20*  --    BUN 14 10  --    CREATININE 1.1 0.6  --    CALCIUM  --  7.7*  --    PHOS  --  2.4  --      Recent Labs     07/22/20 0339   AST 23   ALT 20   BILITOT 0.7   ALKPHOS 53     No results for input(s): INR in the last 72 hours.   Recent Labs 07/21/20  [address] 0.018       Microbiology:    Blood culture #1: No results found for: BC    Blood culture #2:No results found for: Renu Hint    Organism:No results found for: ORG    No results found for: LABGRAM    MRSA culture only:No results found for: [address]     Urine culture: No results found for: LABURIN    Respiratory culture: No results found for: CULTRESP    Aerobic and Anaerobic :  No results found for: LABAERO  No results found for: LABANAE    Urinalysis:    No results found for: Sissy Blazer, BACTERIA, RBCUA, [place] Roxana    Radiology:    Cta Head W Wo Contrast    Result Date: 7/22/2020  PROCEDURE: CTA HEAD W WO CONTRAST CLINICAL INFORMATION: Headache. COMPARISON: No prior study. TECHNIQUE: 1 mm CT axial images were obtained through the head after the fast bolus administration of contrast. A noncontrast localizer was obtained. 3-D reconstructions were performed on a dedicated 3-D workstation. These include multiplanar MPR images and multiplanar MIP images. 80 cc Isovue 370 intravenous contrast were given. All CT scans at this facility use dose modulation, iterative reconstruction, and/or weight-based dosing when appropriate to reduce radiation dose to as low as reasonably achievable. FINDINGS: There is enlargement of the suprasellar cistern and pituitary gland measuring 1.6 cm concerning for pituitary lesion possible macroadenoma. There is mild mass effect along the cavernous sinus on the left which may be contributing to patient's symptoms. Contrast-enhanced MRI is advised for further evaluation. Orbits appear symmetric. There is near complete opacification of the left maxillary sinus extending into the ethmoid sinus with widening of the ostiomeatal unit. Bony remodeling suggesting chronic finding. Polyposis cannot be excluded. Correlation is advised.  No hemodynamically significant stenosis or occlusion within the intracranial circulation at the centered at the [place]. There is enlargement of the suprasellar cistern and pituitary gland measuring 1.6 cm concerning for pituitary lesion possible macroadenoma. There is mild mass effect along the cavernous sinus on the left which may be contributing to patient's symptoms. Contrast-enhanced MRI is advised for further evaluation. Findings were discussed with Dr. Army Lehman at 9:09 PM on 7/21/2020 Examination on the right by a neuroradiologist tomorrow. Please see the complete report. **This report has been created using voice recognition software. It may contain minor errors which are inherent in voice recognition technology. ** FINAL REPORT: PROCEDURE: CTA HEAD W WO CONTRAST CLINICAL INFORMATION: Headache. Worsening headache. Diaphoresis, confusion, hypertension. COMPARISON: CT head from the same date. TECHNIQUE: 1 mm CT axial images were obtained through the head during fast bolus administration of 100 mL Isovue-370 injected in the left AC. A noncontrast localizer was obtained. 3-D reconstructions were performed on a dedicated 3-D workstation. These include multiplanar MPR images and multiplanar MIP images. All CT scans at this facility use dose modulation, iterative reconstruction, and/or weight-based dosing when appropriate to reduce radiation dose to as low as reasonably achievable. FINDINGS:  There is a sellar mass measuring 2.4 x 2 cm in greatest axial dimensions. This expands the sella with remodeling of the adjacent bone. There is some suprasellar extension though does not contact the anterior cerebral arteries. The lesion may involve the  left cavernous sinus. Intracranial segments of the internal carotid arteries are patent without focal stenosis or visualized aneurysm. The cavernous segment of left internal carotid artery is displaced laterally by the sellar mass. The bilateral middle cerebral and anterior cerebral arteries are patent without focal abnormality identified.  The basilar artery is patent without focal stenosis or visualized aneurysm. The bilateral posterior cerebral arteries are patent without focal abnormality identified. Dural venous sinuses  appear patent without focal filling defect. No focal areas of abnormal parenchymal enhancement are identified. IMPRESSION:  1. Sellar mass with bony remodeling which may involve the left cavernous sinus and mildly displaces the cavernous segment of left internal carotid artery laterally. Recommend MRI of the sella with contrast for further evaluation. 2. No flow-limiting stenosis or aneurysm in the intracranial circulation. **This report has been created using voice recognition software. It may contain minor errors which are inherent in voice recognition technology. ** Final report electronically signed by Dr. Lencho Jasso MD on 7/22/2020 9:22 AM    Ct Head Wo Contrast    Addendum Date: 7/21/2020     ADDENDUM #1  Upon further evaluation there is a soft tissue approximately 1.5 cm which is isodense to underlying brain parenchyma in the suprasellar space with bony remodeling suggesting possible pituitary lesion. Differential diagnosis would include pituitary macroadenoma. Other etiologies are not excluded. Contrast-enhanced MRI is advised. These findings and addendum were discussed with Dr. Radha Chávez at 9:13 PM on 7/21/2020 Final report electronically signed by Dr. Ivon Chapman on 7/21/2020 9:15 PMORIGINAL REPORT PROCEDURE: CT HEAD WO CONTRAST CLINICAL INFORMATION: fatigue. COMPARISON: No prior study. TECHNIQUE: Noncontrast 5 mm axial images were obtained through the brain. All CT scans at this facility use dose modulation, iterative reconstruction, and/or weight-based dosing when appropriate to reduce radiation dose to as low as reasonably achievable. FINDINGS: No acute intracranial findings. Gray-white differentiation is preserved. No acute hemorrhage or midline shift. Ventricles are within normal limits for age.  Near complete opacification of the left maxillary sinus and ethmoid and sphenoid sinuses. Correlate for chronic sinus disease or polyposis. Mastoid air cells are patent. Skull: Unremarkable. Soft tissues: Unremarkable. Result Date: 7/21/2020  PROCEDURE: CT HEAD WO CONTRAST CLINICAL INFORMATION: fatigue. COMPARISON: No prior study. TECHNIQUE: Noncontrast 5 mm axial images were obtained through the brain. All CT scans at this facility use dose modulation, iterative reconstruction, and/or weight-based dosing when appropriate to reduce radiation dose to as low as reasonably achievable. FINDINGS: No acute intracranial findings. Gray-white differentiation is preserved. No acute hemorrhage or midline shift. Ventricles are within normal limits for age. Near complete opacification of the left maxillary sinus and ethmoid and sphenoid sinuses. Correlate for chronic sinus disease or polyposis. Mastoid air cells are patent. Skull: Unremarkable. Soft tissues: Unremarkable. No acute intracranial findings. Near complete opacification of the left maxillary sinus and ethmoid and sphenoid sinuses. Correlate for chronic sinus disease with bony remodeling or polyposis. Correlation with symptoms and follow-up advised. **This report has been created using voice recognition software. It may contain minor errors which are inherent in voice recognition technology. ** Final report electronically signed by Dr. Oxana Riojas on 7/21/2020 4:03 PM    Mri Brain W Wo Contrast    Result Date: 7/22/2020  PROCEDURE: MRI BRAIN W WO CONTRAST INDICATION:Macroadenoma / concern for mild mass effect along the L cavernous sinus (see below). Mass seen on previous imaging. COMPARISON: CT and CTA head dated 7/21/2020. TECHNIQUE: Multiplanar and multiple spin echo T1 and T2-weighted images were obtained through the brain before and after the administration of intravenous contrast. Additional high-resolution sequences were performed through the pituitary gland.  These include coronal T2-weighted images and recognition technology. ** Final report electronically signed by Dr. Lencho Jasso MD on 7/22/2020 10:52 AM      Electronically signed by Raudel Santillan MD on 7/22/2020 at 2:18 PM

## 2020-07-23 PROBLEM — G44.89 OTHER HEADACHE SYNDROME: Status: ACTIVE | Noted: 2020-07-23

## 2020-07-23 PROBLEM — D35.2 PITUITARY ADENOMA (HCC): Status: ACTIVE | Noted: 2020-07-23

## 2020-07-23 PROBLEM — E23.6 PITUITARY APOPLEXY (HCC): Status: ACTIVE | Noted: 2020-07-23

## 2020-07-23 LAB
ALBUMIN SERPL-MCNC: 3.3 G/DL (ref 3.5–5.1)
ALP BLD-CCNC: 53 U/L (ref 38–126)
ALT SERPL-CCNC: 19 U/L (ref 11–66)
AMORPHOUS: ABNORMAL
ANION GAP SERPL CALCULATED.3IONS-SCNC: 13 MEQ/L (ref 8–16)
AST SERPL-CCNC: 24 U/L (ref 5–40)
BACTERIA: ABNORMAL
BASOPHILS # BLD: 0.1 %
BASOPHILS ABSOLUTE: 0 THOU/MM3 (ref 0–0.1)
BILIRUB SERPL-MCNC: 0.8 MG/DL (ref 0.3–1.2)
BILIRUBIN URINE: NEGATIVE
BLOOD, URINE: ABNORMAL
BUN BLDV-MCNC: 8 MG/DL (ref 7–22)
C-REACTIVE PROTEIN: 6.63 MG/DL (ref 0–1)
CALCIUM SERPL-MCNC: 7.5 MG/DL (ref 8.5–10.5)
CASTS: ABNORMAL /LPF
CASTS: ABNORMAL /LPF
CHARACTER, URINE: CLEAR
CHLORIDE BLD-SCNC: 93 MEQ/L (ref 98–111)
CO2: 18 MEQ/L (ref 23–33)
COLOR: YELLOW
CORTISOL: 2.21 UG/DL
CREAT SERPL-MCNC: 0.7 MG/DL (ref 0.4–1.2)
CRYSTALS: ABNORMAL
CRYSTALS: ABNORMAL
EOSINOPHIL # BLD: 1.6 %
EOSINOPHILS ABSOLUTE: 0.2 THOU/MM3 (ref 0–0.4)
EPITHELIAL CELLS, UA: ABNORMAL /HPF
ERYTHROCYTE [DISTWIDTH] IN BLOOD BY AUTOMATED COUNT: 11.9 % (ref 11.5–14.5)
ERYTHROCYTE [DISTWIDTH] IN BLOOD BY AUTOMATED COUNT: 38.5 FL (ref 35–45)
GFR SERPL CREATININE-BSD FRML MDRD: > 90 ML/MIN/1.73M2
GLUCOSE BLD-MCNC: 212 MG/DL (ref 70–108)
GLUCOSE BLD-MCNC: 237 MG/DL (ref 70–108)
GLUCOSE BLD-MCNC: 246 MG/DL (ref 70–108)
GLUCOSE BLD-MCNC: 277 MG/DL (ref 70–108)
GLUCOSE BLD-MCNC: 343 MG/DL (ref 70–108)
GLUCOSE, URINE: 500 MG/DL
HCT VFR BLD CALC: 36.8 % (ref 42–52)
HEMOGLOBIN: 13.2 GM/DL (ref 14–18)
IMMATURE GRANS (ABS): 0.05 THOU/MM3 (ref 0–0.07)
IMMATURE GRANULOCYTES: 0.5 %
KETONES, URINE: >= 80
LEUKOCYTE EST, POC: NEGATIVE
LYMPHOCYTES # BLD: 9.2 %
LYMPHOCYTES ABSOLUTE: 0.9 THOU/MM3 (ref 1–4.8)
MAGNESIUM: 1.6 MG/DL (ref 1.6–2.4)
MCH RBC QN AUTO: 32 PG (ref 26–33)
MCHC RBC AUTO-ENTMCNC: 35.9 GM/DL (ref 32.2–35.5)
MCV RBC AUTO: 89.1 FL (ref 80–94)
MISCELLANEOUS LAB TEST RESULT: ABNORMAL
MISCELLANEOUS LAB TEST RESULT: ABNORMAL
MONOCYTES # BLD: 14.3 %
MONOCYTES ABSOLUTE: 1.4 THOU/MM3 (ref 0.4–1.3)
MUCUS: ABNORMAL
NITRITE, URINE: NEGATIVE
NUCLEATED RED BLOOD CELLS: 0 /100 WBC
PH UA: 5.5 (ref 5–9)
PHOSPHORUS: 1.9 MG/DL (ref 2.4–4.7)
PLATELET # BLD: 172 THOU/MM3 (ref 130–400)
PMV BLD AUTO: 9.5 FL (ref 9.4–12.4)
POTASSIUM SERPL-SCNC: 3.8 MEQ/L (ref 3.5–5.2)
PROCALCITONIN: 0.19 NG/ML (ref 0.01–0.09)
PROTEIN UA: 100 MG/DL
RBC # BLD: 4.13 MILL/MM3 (ref 4.7–6.1)
RBC URINE: ABNORMAL /HPF
RENAL EPITHELIAL, UA: ABNORMAL
SEG NEUTROPHILS: 74.3 %
SEGMENTED NEUTROPHILS ABSOLUTE COUNT: 7.3 THOU/MM3 (ref 1.8–7.7)
SODIUM BLD-SCNC: 119 MEQ/L (ref 135–145)
SODIUM BLD-SCNC: 120 MEQ/L (ref 135–145)
SODIUM BLD-SCNC: 120 MEQ/L (ref 135–145)
SODIUM BLD-SCNC: 121 MEQ/L (ref 135–145)
SODIUM BLD-SCNC: 123 MEQ/L (ref 135–145)
SODIUM BLD-SCNC: 123 MEQ/L (ref 135–145)
SODIUM BLD-SCNC: 124 MEQ/L (ref 135–145)
SODIUM BLD-SCNC: 124 MEQ/L (ref 135–145)
SPECIFIC GRAVITY UA: >= 1.03 (ref 1–1.03)
TOTAL PROTEIN: 5.4 G/DL (ref 6.1–8)
TSH SERPL DL<=0.05 MIU/L-ACNC: 2.43 UIU/ML (ref 0.4–4.2)
UROBILINOGEN, URINE: 0.2 EU/DL (ref 0–1)
WBC # BLD: 9.8 THOU/MM3 (ref 4.8–10.8)
WBC UA: ABNORMAL /HPF
YEAST: ABNORMAL

## 2020-07-23 PROCEDURE — 36556 INSERT NON-TUNNEL CV CATH: CPT | Performed by: NURSE PRACTITIONER

## 2020-07-23 PROCEDURE — 1200000003 HC TELEMETRY R&B

## 2020-07-23 PROCEDURE — 87086 URINE CULTURE/COLONY COUNT: CPT

## 2020-07-23 PROCEDURE — 86140 C-REACTIVE PROTEIN: CPT

## 2020-07-23 PROCEDURE — 99233 SBSQ HOSP IP/OBS HIGH 50: CPT | Performed by: INTERNAL MEDICINE

## 2020-07-23 PROCEDURE — 6370000000 HC RX 637 (ALT 250 FOR IP): Performed by: FAMILY MEDICINE

## 2020-07-23 PROCEDURE — 2580000003 HC RX 258: Performed by: FAMILY MEDICINE

## 2020-07-23 PROCEDURE — 84443 ASSAY THYROID STIM HORMONE: CPT

## 2020-07-23 PROCEDURE — 51702 INSERT TEMP BLADDER CATH: CPT

## 2020-07-23 PROCEDURE — 6360000002 HC RX W HCPCS: Performed by: FAMILY MEDICINE

## 2020-07-23 PROCEDURE — 84295 ASSAY OF SERUM SODIUM: CPT

## 2020-07-23 PROCEDURE — 84100 ASSAY OF PHOSPHORUS: CPT

## 2020-07-23 PROCEDURE — 84145 PROCALCITONIN (PCT): CPT

## 2020-07-23 PROCEDURE — 82948 REAGENT STRIP/BLOOD GLUCOSE: CPT

## 2020-07-23 PROCEDURE — 94640 AIRWAY INHALATION TREATMENT: CPT

## 2020-07-23 PROCEDURE — 85025 COMPLETE CBC W/AUTO DIFF WBC: CPT

## 2020-07-23 PROCEDURE — 87040 BLOOD CULTURE FOR BACTERIA: CPT

## 2020-07-23 PROCEDURE — 80053 COMPREHEN METABOLIC PANEL: CPT

## 2020-07-23 PROCEDURE — 6370000000 HC RX 637 (ALT 250 FOR IP): Performed by: INTERNAL MEDICINE

## 2020-07-23 PROCEDURE — 81001 URINALYSIS AUTO W/SCOPE: CPT

## 2020-07-23 PROCEDURE — 36592 COLLECT BLOOD FROM PICC: CPT

## 2020-07-23 PROCEDURE — 6360000002 HC RX W HCPCS: Performed by: NURSE PRACTITIONER

## 2020-07-23 PROCEDURE — 94761 N-INVAS EAR/PLS OXIMETRY MLT: CPT

## 2020-07-23 PROCEDURE — 83735 ASSAY OF MAGNESIUM: CPT

## 2020-07-23 PROCEDURE — 82533 TOTAL CORTISOL: CPT

## 2020-07-23 PROCEDURE — 36415 COLL VENOUS BLD VENIPUNCTURE: CPT

## 2020-07-23 RX ORDER — HYDRALAZINE HYDROCHLORIDE 20 MG/ML
INJECTION INTRAMUSCULAR; INTRAVENOUS
Status: DISPENSED
Start: 2020-07-23 | End: 2020-07-23

## 2020-07-23 RX ORDER — HYDRALAZINE HYDROCHLORIDE 20 MG/ML
10 INJECTION INTRAMUSCULAR; INTRAVENOUS EVERY 6 HOURS PRN
Status: DISCONTINUED | OUTPATIENT
Start: 2020-07-23 | End: 2020-07-26 | Stop reason: HOSPADM

## 2020-07-23 RX ORDER — TOLVAPTAN 15 MG/1
15 TABLET ORAL DAILY
Status: DISCONTINUED | OUTPATIENT
Start: 2020-07-24 | End: 2020-07-23

## 2020-07-23 RX ORDER — FUROSEMIDE 20 MG/1
20 TABLET ORAL ONCE
Status: COMPLETED | OUTPATIENT
Start: 2020-07-23 | End: 2020-07-23

## 2020-07-23 RX ORDER — SODIUM BICARBONATE 650 MG/1
1300 TABLET ORAL 2 TIMES DAILY
Status: DISCONTINUED | OUTPATIENT
Start: 2020-07-23 | End: 2020-07-25

## 2020-07-23 RX ORDER — TOLVAPTAN 15 MG/1
15 TABLET ORAL DAILY
Status: DISCONTINUED | OUTPATIENT
Start: 2020-07-23 | End: 2020-07-24

## 2020-07-23 RX ORDER — COSYNTROPIN 0.25 MG/ML
250 INJECTION, POWDER, FOR SOLUTION INTRAMUSCULAR; INTRAVENOUS ONCE
Status: COMPLETED | OUTPATIENT
Start: 2020-07-24 | End: 2020-07-24

## 2020-07-23 RX ADMIN — TAMSULOSIN HYDROCHLORIDE 0.4 MG: 0.4 CAPSULE ORAL at 23:26

## 2020-07-23 RX ADMIN — FUROSEMIDE 20 MG: 20 TABLET ORAL at 17:00

## 2020-07-23 RX ADMIN — ACETAMINOPHEN 650 MG: 325 TABLET ORAL at 00:39

## 2020-07-23 RX ADMIN — SODIUM CHLORIDE TAB 1 GM 2 G: 1 TAB at 13:45

## 2020-07-23 RX ADMIN — INSULIN LISPRO 2 UNITS: 100 INJECTION, SOLUTION INTRAVENOUS; SUBCUTANEOUS at 06:32

## 2020-07-23 RX ADMIN — ACETAMINOPHEN 650 MG: 325 TABLET ORAL at 10:01

## 2020-07-23 RX ADMIN — SODIUM CHLORIDE, PRESERVATIVE FREE 10 ML: 5 INJECTION INTRAVENOUS at 10:15

## 2020-07-23 RX ADMIN — ACETAMINOPHEN 650 MG: 325 TABLET ORAL at 17:47

## 2020-07-23 RX ADMIN — SODIUM BICARBONATE 1300 MG: 650 TABLET ORAL at 23:26

## 2020-07-23 RX ADMIN — SODIUM CHLORIDE TAB 1 GM 2 G: 1 TAB at 17:00

## 2020-07-23 RX ADMIN — INSULIN LISPRO 4 UNITS: 100 INJECTION, SOLUTION INTRAVENOUS; SUBCUTANEOUS at 17:00

## 2020-07-23 RX ADMIN — BUDESONIDE AND FORMOTEROL FUMARATE DIHYDRATE 2 PUFF: 160; 4.5 AEROSOL RESPIRATORY (INHALATION) at 10:02

## 2020-07-23 RX ADMIN — ACETAMINOPHEN 650 MG: 325 TABLET ORAL at 23:25

## 2020-07-23 RX ADMIN — ALLOPURINOL 300 MG: 300 TABLET ORAL at 10:02

## 2020-07-23 RX ADMIN — INSULIN LISPRO 2 UNITS: 100 INJECTION, SOLUTION INTRAVENOUS; SUBCUTANEOUS at 00:09

## 2020-07-23 RX ADMIN — SODIUM CHLORIDE, PRESERVATIVE FREE 10 ML: 5 INJECTION INTRAVENOUS at 22:00

## 2020-07-23 RX ADMIN — LISINOPRIL 20 MG: 20 TABLET ORAL at 10:02

## 2020-07-23 RX ADMIN — SODIUM CHLORIDE TAB 1 GM 2 G: 1 TAB at 10:01

## 2020-07-23 RX ADMIN — HYDRALAZINE HYDROCHLORIDE 10 MG: 20 INJECTION, SOLUTION INTRAMUSCULAR; INTRAVENOUS at 02:38

## 2020-07-23 RX ADMIN — INSULIN LISPRO 3 UNITS: 100 INJECTION, SOLUTION INTRAVENOUS; SUBCUTANEOUS at 13:42

## 2020-07-23 RX ADMIN — POTASSIUM & SODIUM PHOSPHATES POWDER PACK 280-160-250 MG 250 MG: 280-160-250 PACK at 23:26

## 2020-07-23 RX ADMIN — TOLVAPTAN 15 MG: 15 TABLET ORAL at 23:34

## 2020-07-23 RX ADMIN — FINASTERIDE 5 MG: 5 TABLET, FILM COATED ORAL at 10:02

## 2020-07-23 RX ADMIN — ONDANSETRON 4 MG: 2 INJECTION INTRAMUSCULAR; INTRAVENOUS at 04:19

## 2020-07-23 ASSESSMENT — PAIN DESCRIPTION - PROGRESSION
CLINICAL_PROGRESSION: NOT CHANGED
CLINICAL_PROGRESSION: GRADUALLY WORSENING
CLINICAL_PROGRESSION: NOT CHANGED

## 2020-07-23 ASSESSMENT — PAIN SCALES - GENERAL
PAINLEVEL_OUTOF10: 3
PAINLEVEL_OUTOF10: 0
PAINLEVEL_OUTOF10: 0
PAINLEVEL_OUTOF10: 3

## 2020-07-23 ASSESSMENT — PAIN DESCRIPTION - ONSET: ONSET: GRADUAL

## 2020-07-23 ASSESSMENT — PAIN DESCRIPTION - LOCATION
LOCATION: HEAD
LOCATION: HEAD

## 2020-07-23 ASSESSMENT — PAIN DESCRIPTION - PAIN TYPE
TYPE: ACUTE PAIN
TYPE: ACUTE PAIN

## 2020-07-23 ASSESSMENT — PAIN DESCRIPTION - DIRECTION: RADIATING_TOWARDS: BEHIND EYES

## 2020-07-23 ASSESSMENT — PAIN DESCRIPTION - DESCRIPTORS: DESCRIPTORS: DULL

## 2020-07-23 ASSESSMENT — PAIN DESCRIPTION - FREQUENCY: FREQUENCY: INTERMITTENT

## 2020-07-23 ASSESSMENT — PAIN DESCRIPTION - ORIENTATION: ORIENTATION: ANTERIOR

## 2020-07-23 NOTE — FLOWSHEET NOTE
Advance Directive Consult: Advance Directive education provided and forms were completed. Copies placed in chart and original returned to patient. Physician notified. Copy sent to Medical Records. His niece was there earlier today and answered questions he and this  had.       07/23/20 1704   Encounter Summary   Services provided to: Patient   Referral/Consult From: Rounding;Nurse   Support System Family members   Continue Visiting Yes  (7/23 AD completed)   Complexity of Encounter Moderate   Length of Encounter 45 minutes   Advance Care Planning Yes   Spiritual/Rastafari   Type Spiritual support   Advance Directives (For Healthcare)   Healthcare Directive Yes, patient has an advance directive for healthcare treatment   Type of Healthcare Directive Durable power of  for health care; Health care treatment directive; Living will   Copy in Chart Yes, copy in chart   Chart Copy Status : Active   Date Reviewed and Current: 07/23/20   Information on Healthcare Directives Requested Yes   Patient Requests Assistance Yes, referral made to    Advance Directives Documents explained;Living will completed; Healthcare power of attornery completed; Copy in Media tab;Copy in paper [address] Agent's Name Niece, Bonnie Rea adn her  [address] Agent's Phone Number [phone] or [phone]   If you are unable to speak for yourself, does your Healthcare Agent or Legal Spokesperson know your healthcare wishes? Yes   Care Plan:  Continue spiritual and emotional care for patient and family. Including prayers.

## 2020-07-23 NOTE — FLOWSHEET NOTE
[place] [id number] PROGRESS NOTE      Patient: Greg Terry  Room #: 6V-15/831-B            YOB: 1960  Age: 60 y.o. Gender: male            Admit Date & Time: 7/21/2020  3:24 PM    Assessment:  Jody Perez is a 60year old male. This  visited patient due to spiritual consult to assist patient with Advanced Directive and Living Will. At the time of entry patient is in his be tired and could barely open his eyes to talk for long. Patient managed to say he would like to have his niece Tonja Pollack to be his medical POA and told this  we have to wait until tomorrow when he is feeling better and more alert. Patient asked if  had a copy of the document for him so his niece can read through the next day when she is here. Interventions:  Advance Directive Consult: Advance Directive materials were provided to patient and explained. Patient is not ready to complete at this time, gave information for Spiritual Care to be contacted if further assistance is needed. Outcomes:  Patient gladly received the document and said he and his niece will look it over. Patient also showed appreciation that this  brought the document to him and prayed for him. Plan: 1.SC staff will follow up the next day during rounding to assist patient and his family in filling out document. [facility] service remain available to patient as needed.      Electronically signed by Servando Barillas, on 7/22/2020 at 10:55 PM.  [facility]  [phone]

## 2020-07-23 NOTE — PROGRESS NOTES
07/23/20  0345 07/23/20  0530   *   < > 119*  119*   < > 121* 120* 124*   K 4.4  --  4.0  --   --   --  3.8   CL 90*  --  86*  --   --   --  93*   CO2 20*  --  19*  --   --   --  18*   BUN 10  --  10  --   --   --  8   CREATININE 0.6  --  0.8  --   --   --  0.7   GLUCOSE 210*  --  302*  --   --   --  212*   CALCIUM 7.7*  --  7.7*  --   --   --  7.5*   MG 1.4*  --  1.8  --   --   --  1.6   PHOS 2.4  --   --   --   --   --  1.9*    < > = values in this interval not displayed. Hepatic:   Recent Labs     07/22/20  0339 07/23/20  0530   LABALBU 3.5 3.3*   AST 23 24   ALT 20 19   BILITOT 0.7 0.8   ALKPHOS 53 53         Meds:  Infusion:    dextrose      sodium chloride Stopped (07/23/20 0813)     Meds:    hydrALAZINE        insulin lispro  0-6 Units Subcutaneous Q6H    sodium chloride  2 g Oral TID WC    allopurinol  300 mg Oral Daily    lisinopril  20 mg Oral Daily    finasteride  5 mg Oral Daily    fluticasone  1 spray Each Nostril Daily    budesonide-formoterol  2 puff Inhalation BID    atorvastatin  10 mg Oral Nightly    tamsulosin  0.4 mg Oral Nightly    sodium chloride flush  10 mL Intravenous 2 times per day    [Held by provider] enoxaparin  40 mg Subcutaneous Daily     Meds prn: hydrALAZINE, glucose, dextrose, glucagon (rDNA), dextrose, metoprolol, sodium chloride flush, acetaminophen **OR** acetaminophen, polyethylene glycol, promethazine **OR** ondansetron       Impression and Plan:  1. Acute hyponatremia. Initial urine electrolytes were suggestive of SIADH  Serum sodium worsened with normal saline replacement as well as patient drinking excessive amount of water yesterday. Sodium acutely dropped and needed to be started on 3% saline  Was transferred to ICU last night and tolerated 3% saline well  Appropriate correction so far  Stop 3% saline and follow serial sodium levels  Continue with free water restriction  Will c/w sodium chloride tablets  May consider low-dose Lasix  2.   Mild metabolic acidosis will monitor. Will start patient on sodium bicarbonate, check venous pH in the morning  3. Hypophosphatemia. We will replace with Neutra-Phos packets 3 times a day with meals  4. Pituitary lesion. Patient seen by neurosurgery, neurosurgery recommends endocrinology evaluation  5. Hyperglycemia  6. Hypertension.   Continue with lisinopril    D/W patient and ICU RN  Discussed with Dr. Yehuda Morse, intensivist and ICU team    Tigist Reyes MD  [facility]

## 2020-07-23 NOTE — CONSULTS
[address] [place] [address]                                  CONSULTATION    PATIENT NAME: Zachary Miller                     :        1960  MED REC NO:   [id number]                           ROOM:       0011  ACCOUNT NO:   [de-identified]                           ADMIT DATE: 2020  PROVIDER:     Bella Vaughn. Uvaldo Aguilar M.D.    Eliseo Breaux:  2020    NEUROSURGERY CONSULTATION    TIME OF SERVICE:  09:20 p:m.    REQUESTING PROVIDER:  Angel Amato DO    HISTORY OF PRESENT ILLNESS:  The patient has a pituitary mass and  hyponatremia. He is denying having any visual difficulties. He does  complain of frontal headache. CT as well as MRI showed the pituitary  mass. The sella is enlarged. So, this is probably a chronically  enlarged pituitary. It does elevate touching the optic chiasm, but does  not appear to be compressing the optic chiasm. It does extend to the  left cavernous sinus. PHYSICAL EXAMINATION:  On examination, the patient is awake and alert. Oriented to person, place, day, date, and situation. Good strength  throughout. Moves all fours well. Follows commands. Extraocular  movements intact. Pupils are equal, round, and reactive to light. Visual field to confrontation is full. ASSESSMENT:  This patient is with pituitary mass. He is being treated  for hyponatremia. RECOMMENDATIONS:  No emergency neurosurgical intervention is needed. The patient will be best served to have a skullbase neurosurgeon at a  tertiary care center such as [facility], [facility],  [facility], etc.  Endocrinology workup would be in order as well as  referral with formal visual field testing by the neuro-ophthalmologist.   He will follow up with me on an as nedeed basis. I will sign off with  inpatient care.         Ashley Banda M.D.    D: 2020 10:21:55       T: 2020 10:47:54 [email or web address]  Job#: [id number]     Doc#: [id number]    CC:                                          ADDENDUM TO THE ABOVE H&P/CONSULTATION     Wilda Carbajal   YOB: 1960  Account Number: [de-identified]       HISTORY OF PRESENT ILLNESS:  iWlda Carbajal is a 60 y.o. male, admitted on :7/21/2020  3:24 PM      PROBLEM LIST:  Patient Active Problem List   Diagnosis    Hyponatremia    Pituitary adenoma ([facility]    Other headache syndrome    [facility]    Hypertensive urgency       MEDICATIONS: []None []Unknown  Prior to Admission medications    Medication Sig Start Date End Date Taking?  Authorizing Provider   fluticasone (FLONASE) 50 MCG/ACT nasal spray 1 spray by Each Nostril route daily 1-2 sprays in each nostril daily   Yes Historical Provider, MD   tamsulosin (FLOMAX) 0.4 MG capsule Take 0.4 mg by mouth nightly   Yes Historical Provider, MD   benazepril (LOTENSIN) 20 MG tablet Take 20 mg by mouth daily   Yes Historical Provider, MD   allopurinol (ZYLOPRIM) 300 MG tablet Take 300 mg by mouth daily   Yes Historical Provider, MD   simvastatin (ZOCOR) 20 MG tablet Take 20 mg by mouth nightly   Yes Historical Provider, MD   glimepiride (AMARYL) 4 MG tablet Take 4 mg by mouth every morning (before breakfast)   Yes Historical Provider, MD   metFORMIN (GLUCOPHAGE) 1000 MG tablet Take 500 mg by mouth 2 times daily (with meals)   Yes Historical Provider, MD   Fluticasone furoate-vilanterol (BREO ELLIPTA) 200-25 MCG/INH AEPB inhaler Inhale 1 puff into the lungs daily 3/24/20  Yes ADRY Finney - CNP   albuterol sulfate HFA (PROAIR HFA) 108 (90 Base) MCG/ACT inhaler Inhale 1 puff into the lungs every 4 hours as needed for Wheezing    Historical Provider, MD   finasteride (PROSCAR) 5 MG tablet Take 5 mg by mouth daily    Historical Provider, MD       Current Facility-Administered Medications   Medication Dose Route Frequency Provider Last Rate Last Dose    hydrALAZINE (APRESOLINE) injection 10 mg 10 mg Intravenous Q6H PRN ADRY Gan - CNP   10 mg at 07/23/20 0238    [START ON 7/24/2020] cosyntropin (CORTROSYN) injection 250 mcg  250 mcg Intravenous Once Herb Crowell MD        sodium bicarbonate tablet 1,300 mg  1,300 mg Oral BID Elizabeth Bergman MD        potassium & sodium phosphates (PHOS-NAK) 280-160-250 MG packet 250 mg  1 packet Oral 4x Daily Elizabeth Bergman MD        glucose (GLUTOSE) 40 % oral gel 15 g  15 g Oral PRN Yara Duran MD        dextrose 50 % IV solution  12.5 g Intravenous PRN Yara Duran MD        glucagon (rDNA) injection 1 mg  1 mg Intramuscular PRN Yara Duran MD        dextrose 5 % solution  100 mL/hr Intravenous PRN Yara Duran MD        insulin lispro (HUMALOG) injection vial 0-6 Units  0-6 Units Subcutaneous Q6H Yara Duran MD   4 Units at 07/23/20 1700    sodium chloride tablet 2 g  2 g Oral TID WC Elizabeth Bergman MD   2 g at 07/23/20 1700    metoprolol (LOPRESSOR) injection 5 mg  5 mg Intravenous Q6H PRN Severiano Atlas, MD   5 mg at 07/22/20 2131    allopurinol (ZYLOPRIM) tablet 300 mg  300 mg Oral Daily Yara Duran MD   300 mg at 07/23/20 1002    lisinopril (PRINIVIL;ZESTRIL) tablet 20 mg  20 mg Oral Daily Yara Duran MD   20 mg at 07/23/20 1002    finasteride (PROSCAR) tablet 5 mg  5 mg Oral Daily Yara Duran MD   5 mg at 07/23/20 1002    fluticasone (FLONASE) 50 MCG/ACT nasal spray 1 spray  1 spray Each Nostril Daily Yara Duran MD   1 spray at 07/22/20 0839    budesonide-formoterol (SYMBICORT) 160-4.5 MCG/ACT inhaler 2 puff  2 puff Inhalation BID Yara Duran MD   2 puff at 07/23/20 1002    atorvastatin (LIPITOR) tablet 10 mg  10 mg Oral Nightly Letty Mercy, MD   10 mg at 07/22/20 2109    tamsulosin (FLOMAX) capsule 0.4 mg  0.4 mg Oral Nightly Yara Duran MD   0.4 mg at 07/22/20 2109    sodium chloride flush 0.9 % injection 10 mL 10 mL Intravenous 2 times per day Conchita Enciso MD   10 mL at 07/23/20 1015    sodium chloride flush 0.9 % injection 10 mL  10 mL Intravenous PRN Yara Duran MD   10 mL at 07/22/20 0022    acetaminophen (TYLENOL) tablet 650 mg  650 mg Oral Q6H PRN Yara Duran MD   650 mg at 07/23/20 1747    Or    acetaminophen (TYLENOL) suppository 650 mg  650 mg Rectal Q6H PRN Yara Duran MD        polyethylene glycol (GLYCOLAX) packet 17 g  17 g Oral Daily PRN Yara Duran MD        promethazine (PHENERGAN) tablet 12.5 mg  12.5 mg Oral Q6H PRN Yara Duran MD   12.5 mg at 07/22/20 0053    Or    ondansetron (ZOFRAN) injection 4 mg  4 mg Intravenous Q6H PRN Yara Duran MD   4 mg at 07/23/20 0419    [Held by provider] enoxaparin (LOVENOX) injection 40 mg  40 mg Subcutaneous Daily Yara Duran MD            hydrALAZINE, 10 mg, Q6H PRN  glucose, 15 g, PRN  dextrose, 12.5 g, PRN  glucagon (rDNA), 1 mg, PRN  dextrose, 100 mL/hr, PRN  metoprolol, 5 mg, Q6H PRN  sodium chloride flush, 10 mL, PRN  acetaminophen, 650 mg, Q6H PRN    Or  acetaminophen, 650 mg, Q6H PRN  polyethylene glycol, 17 g, Daily PRN  promethazine, 12.5 mg, Q6H PRN    Or  ondansetron, 4 mg, Q6H PRN         dextrose           ALLERGIES: []None []Unknown   Patient has no known allergies. PAST MEDICAL  HISTORY: []None []Unknown    has a past medical history of Asthma, Diabetes mellitus ([facility] [address] and Gout. PAST SURGICAL  HISTORY: []None []Unknown   has a past surgical history that includes Carpal tunnel release (Bilateral). SOCIAL HISTORY:   Social History     Tobacco Use    Smoking status: Never Smoker    Smokeless tobacco: Never Used   Substance Use Topics    Alcohol use: Not Currently       FAMILY HISTORY:  History reviewed. No pertinent family history.     LABS  CBC:   Lab Results   Component Value Date    WBC 9.8 07/23/2020    RBC 4.13 07/23/2020    HGB 13.2 07/23/2020    HCT 36.8 07/23/2020    MCV 89.1 07/23/2020    MCH 32.0 07/23/2020    MCHC 35.9 07/23/2020    RDW 13.9 07/21/2020     07/23/2020    MPV 9.5 07/23/2020     BMP:    Lab Results   Component Value Date     07/23/2020    K 3.8 07/23/2020    CL 93 07/23/2020    CO2 18 07/23/2020    BUN 8 07/23/2020    LABALBU 3.3 07/23/2020    CREATININE 0.7 07/23/2020    CALCIUM 7.5 07/23/2020    LABGLOM >90 07/23/2020    GLUCOSE 212 07/23/2020     PT/INR:  No results found for: PROTIME, INR  PTT:  No results found for: APTT, PTT[APTT}  Troponin:    Lab Results   Component Value Date    TROPONINI 0.018 07/21/2020       Ramiro Wheeler  Electronically signed 7/23/2020 at 6:38 PM

## 2020-07-23 NOTE — CARE COORDINATION
Handoff report given to Demond Whitaker, FREDO CM.   Electronically signed by Harriet Shepherd RN on 7/23/2020 at 7:42 AM

## 2020-07-23 NOTE — PROCEDURES
[facility]    Patient:  Damien Meyers  YOB: 1960    MRN: [id number]   Acct:  [de-identified]      7/22/20 2100    PERFORMED BY: Ricardo Cardenas    PRE PROCEDURE DIAGNOSES: Hyponatremia in need of 3% Normal Saline adminitration    INDICATIONS:vascular access, central venous monitoring, centrally administered medications and need for frequent blood draws. SITE: right  internal jugular vein    CONSENT:  Consent obtained from patient and/or next of kin after explaining indication/risks    TIME OUT: Completed. Hand washing completed prior to procedure. STERILE PREP: Full maximum sterile field/barrier technique was followed (with cap and mask, gloves and sterile gown, as well as broad field sterile drapes). Local disinfection was performed with broad field application of orange dyed chloraprep solution, which was allowed to dry fully prior to the initiation of the procedure. LOCAL ANESTHETIC: Numbed with 1 percent lidocaine, total estimated 5 ml. ESTIMATED BLOOD LOSS:  3 ml. PROCEDURE:  internal jugular vein  easily accessed with a 23-gauge needle and with an 18-gauge needle. Guidewire was passed without difficulty or resistance. Both needles removed, dilator over guidewire with skin snip, dilator removed. 3 lumen 16 CVC catheter advanced over the guidewire without difficulty or resistance to full extent. The guidewire was withdrawn and discarded and good return of dark venous blood. Positive blood aspiration from all lumens and flushed easily. Sutured at 15 cm, Biopatch applied. Chest x-ray is ordered. Chest x-ray confirms position, line in SVC and ready for use. Pneumothorax No, other complications No.      Procedure well tolerated. Critical Care Time: Less than 30 minutes. Plan:  1. 3% Normal Saline to be ran via the central IV access.              Electronically signed by ADRY Tavares CNP on 7/22/2020 at 10:59 PM

## 2020-07-23 NOTE — PROGRESS NOTES
Patient:  Roslyn Millard    Unit/Bed:3A-11/011-A  MRN: [id number]   PCP: Darnell Brunner DO  Date of Admission: 7/21/2020    Assessment and Plan(All pulmonary edema, renal failure, PE, and respiratory failure diagnoses are acute in nature unless otherwise specified):        1. 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 Urine osmolality 784. Urine sodium 172. He failed to comply with fluid restriction. He is status post hypertonic saline. Now on oral fluid restriction with reminders of not to spontaneously drink and to maintain fluid restriction. Continue to trend. Screen for adrenal insufficiency. 2. Pituitary mass: Seen by neurology and neurosurgery. No neurosurgical intervention required acutely. Neurosurgery recommends outpatient referral for visual field assessment, and endocrine assessment. 3. Diabetes mellitus: Minimal proteinuria. On lisinopril for renal protection. 4. Hypertension: Lisinopril. 5. Hyperlipidemia: Statin therapy. 6. Gout: Prophylaxis with allopurinol. 7. BPH: Finasteride and Flomax. 8. Low cortisol: Suspect adrenal insufficiency. Cosyntropin stimulation test pending. Currently not receiving systemic steroids. 9. Fever: Isolated. May be related to adrenal insufficiency. No evidence of active infection. CC: Hyponatremia  HPI: Patient is a 79-year-old white male lifetime non-smoker. He is morbidly obese. He does have impulsivity complex and spontaneity. He carries a diagnosis of gout, diabetes mellitus, and asthma. He has a history of pituitary mass. Patient was seen in the emergency room and admitted on 7/22/2020. He presented with complaints of 9 out of 10 severity frontal headache behind his eyes with some nausea. Pain awoke him from sleep. Underwent CT scan head and subsequent MRI head which showed a pituitary mass measuring 1.6 cm in size. He was found to have hyponatremia and required a short course of 3% saline.   Was placed in the intensive care unit for hypertonic saline therapy. Hypertonic saline was discontinued on 7/23/2020. For further details, please review the assessment and plan. ROS: Anxious about being in the hospital.  Complains of a dry throat. Complains of thirst.  No chest pain or shortness of breath. Patient complains of frontal headaches. PMH:  Per HPI  SHX: Lifetime non-smoker. FHX: No known TB exposure  Allergies: NKDA  Medications:     dextrose      sodium chloride Stopped (07/23/20 0813)      hydrALAZINE        insulin lispro  0-6 Units Subcutaneous Q6H    sodium chloride  2 g Oral TID WC    allopurinol  300 mg Oral Daily    lisinopril  20 mg Oral Daily    finasteride  5 mg Oral Daily    fluticasone  1 spray Each Nostril Daily    budesonide-formoterol  2 puff Inhalation BID    atorvastatin  10 mg Oral Nightly    tamsulosin  0.4 mg Oral Nightly    sodium chloride flush  10 mL Intravenous 2 times per day    [Held by provider] enoxaparin  40 mg Subcutaneous Daily       Vital Signs:   T: 101.1: P: 73 RR: 19 B/P: 139/83: FiO2: RA: O2 Sat:95: I/O: 1405/1850 GCS: 15  Body mass index is 40.07 kg/m². Collette Ruts General:   Morbidly obese white male in no distress. HEENT:  normocephalic and atraumatic. No scleral icterus. PERR  Neck: supple. No Thyromegaly. Lungs: clear to auscultation. No retractions  Cardiac: RRR. No JVD. Abdomen: soft. Nontender. Morbidly obese. Extremities:  No clubbing, cyanosis, or edema x 4. Vasculature: capillary refill < 3 seconds. Palpable dorsalis pedis pulses. Skin:  warm and dry. Psych:  Alert and oriented x3. Affect easily distracted. Slightly agitated. Unable to focus. Lymph:  No supraclavicular adenopathy. Neurologic:  No focal deficit. No seizures. Data: (All radiographs, tracings, PFTs, and imaging are personally viewed and interpreted unless otherwise noted).  Telemetry shows sinus rhythm.  MRI brain completed 7/22/2020 demonstrates a large pituitary mass greater impact on the left.  Sodium 124. Glucose 277. White blood cell count 9.8. Cortisol 2.21.  TSH 2.43. Pro calcitonin 0.19. Case discussed with Dr. Nika Davis.  Case discussed with Dr. Ayo Dickson.  Electronically signed by Tanya Lawton.  Suri Pantoja M.D.

## 2020-07-23 NOTE — CARE COORDINATION
Transferred from  17 to 3A 11 this am.    7/23/20, 9:09 AM EDT    [address] day: 2  Location: 3A-11/011-A Reason for admit: Hyponatremia [E87.1]   Pertinent abnormal Imaging:MRI Brain W WO Contrast                   1. Redemonstration of prominent expansile sellar mass. 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 There is mild heterogeneous intrinsic hyperintense T1 signal within the lesion suggesting possible hemorrhagic component. This causes mass effect on the left cavernous   sinus without evidence of invasion. Superiorly the lesion contacts the optic chiasm. 2. Moderate to severe sinusitis.       CTA Head W WO Contrast                 There is enlargement of the suprasellar cistern and pituitary gland measuring 1.6 cm concerning for pituitary lesion possible macroadenoma. There is mild mass effect along the cavernous sinus on the left which may be contributing to patient's   symptoms. Contrast-enhanced MRI is advised for further evaluation. Procedure:   07/22  Insertion of right IJ  Treatment Plan of Care: Na 124 (lab draws every 2 hrs), 1200 mL strict fluid restrictions, I/O, nephrology following, 3% NS IV as ordered by nephro, diabetic management with ISS, neurology & intensivist following, ramirez care. Barriers to Discharge: not medically ready, low Na  PCP: Neris Rocha DO  Readmission Risk Score: 16%  Patient Goals/Plan/Treatment Preferences: from home alone; plans to return and had declined HH. Has family to support him nieces/nephews. Neurosurgery suggests: patient will need referral to skull base neurosurgeon at tertiary center, but this can be as an outpatient after discharge for large pituitary macroadenoma.

## 2020-07-24 ENCOUNTER — APPOINTMENT (OUTPATIENT)
Dept: ULTRASOUND IMAGING | Age: 60
DRG: 644 | End: 2020-07-24
Payer: COMMERCIAL

## 2020-07-24 LAB
ALBUMIN SERPL-MCNC: 3.4 G/DL (ref 3.5–5.1)
ALP BLD-CCNC: 55 U/L (ref 38–126)
ALT SERPL-CCNC: 18 U/L (ref 11–66)
ANION GAP SERPL CALCULATED.3IONS-SCNC: 10 MEQ/L (ref 8–16)
AST SERPL-CCNC: 21 U/L (ref 5–40)
BASOPHILS # BLD: 0.4 %
BASOPHILS ABSOLUTE: 0 THOU/MM3 (ref 0–0.1)
BILIRUB SERPL-MCNC: 0.6 MG/DL (ref 0.3–1.2)
BUN BLDV-MCNC: 10 MG/DL (ref 7–22)
CALCIUM SERPL-MCNC: 8 MG/DL (ref 8.5–10.5)
CHLORIDE BLD-SCNC: 96 MEQ/L (ref 98–111)
CO2: 20 MEQ/L (ref 23–33)
CORTISOL COLLECTION INFO: NORMAL
CORTISOL: 2.71 UG/DL
CORTISOL: 21.17 UG/DL
CORTISOL: 26.6 UG/DL
CREAT SERPL-MCNC: 1 MG/DL (ref 0.4–1.2)
EOSINOPHIL # BLD: 2.3 %
EOSINOPHILS ABSOLUTE: 0.2 THOU/MM3 (ref 0–0.4)
ERYTHROCYTE [DISTWIDTH] IN BLOOD BY AUTOMATED COUNT: 12 % (ref 11.5–14.5)
ERYTHROCYTE [DISTWIDTH] IN BLOOD BY AUTOMATED COUNT: 39.4 FL (ref 35–45)
GFR SERPL CREATININE-BSD FRML MDRD: 76 ML/MIN/1.73M2
GLUCOSE BLD-MCNC: 213 MG/DL (ref 70–108)
GLUCOSE BLD-MCNC: 227 MG/DL (ref 70–108)
GLUCOSE BLD-MCNC: 263 MG/DL (ref 70–108)
GLUCOSE BLD-MCNC: 287 MG/DL (ref 70–108)
GLUCOSE BLD-MCNC: 299 MG/DL (ref 70–108)
GLUCOSE BLD-MCNC: 398 MG/DL (ref 70–108)
GLUCOSE BLD-MCNC: 451 MG/DL (ref 70–108)
HCT VFR BLD CALC: 38.4 % (ref 42–52)
HEMOGLOBIN: 13.6 GM/DL (ref 14–18)
IMMATURE GRANS (ABS): 0.04 THOU/MM3 (ref 0–0.07)
IMMATURE GRANULOCYTES: 0.5 %
LYMPHOCYTES # BLD: 10.3 %
LYMPHOCYTES ABSOLUTE: 0.8 THOU/MM3 (ref 1–4.8)
MAGNESIUM: 2 MG/DL (ref 1.6–2.4)
MCH RBC QN AUTO: 31.8 PG (ref 26–33)
MCHC RBC AUTO-ENTMCNC: 35.4 GM/DL (ref 32.2–35.5)
MCV RBC AUTO: 89.7 FL (ref 80–94)
MONOCYTES # BLD: 15.1 %
MONOCYTES ABSOLUTE: 1.2 THOU/MM3 (ref 0.4–1.3)
MRSA SCREEN: NORMAL
NUCLEATED RED BLOOD CELLS: 0 /100 WBC
PHOSPHORUS: 2.5 MG/DL (ref 2.4–4.7)
PLATELET # BLD: 161 THOU/MM3 (ref 130–400)
PMV BLD AUTO: 9.2 FL (ref 9.4–12.4)
POTASSIUM SERPL-SCNC: 4 MEQ/L (ref 3.5–5.2)
RBC # BLD: 4.28 MILL/MM3 (ref 4.7–6.1)
SEG NEUTROPHILS: 71.4 %
SEGMENTED NEUTROPHILS ABSOLUTE COUNT: 5.6 THOU/MM3 (ref 1.8–7.7)
SODIUM BLD-SCNC: 121 MEQ/L (ref 135–145)
SODIUM BLD-SCNC: 124 MEQ/L (ref 135–145)
SODIUM BLD-SCNC: 126 MEQ/L (ref 135–145)
SODIUM BLD-SCNC: 126 MEQ/L (ref 135–145)
SODIUM BLD-SCNC: 129 MEQ/L (ref 135–145)
SODIUM BLD-SCNC: 129 MEQ/L (ref 135–145)
SODIUM BLD-SCNC: 130 MEQ/L (ref 135–145)
TOTAL PROTEIN: 5.8 G/DL (ref 6.1–8)
WBC # BLD: 7.8 THOU/MM3 (ref 4.8–10.8)

## 2020-07-24 PROCEDURE — 82533 TOTAL CORTISOL: CPT

## 2020-07-24 PROCEDURE — 6370000000 HC RX 637 (ALT 250 FOR IP): Performed by: FAMILY MEDICINE

## 2020-07-24 PROCEDURE — 84100 ASSAY OF PHOSPHORUS: CPT

## 2020-07-24 PROCEDURE — 2580000003 HC RX 258: Performed by: INTERNAL MEDICINE

## 2020-07-24 PROCEDURE — 94760 N-INVAS EAR/PLS OXIMETRY 1: CPT

## 2020-07-24 PROCEDURE — 99233 SBSQ HOSP IP/OBS HIGH 50: CPT | Performed by: INTERNAL MEDICINE

## 2020-07-24 PROCEDURE — 6360000002 HC RX W HCPCS: Performed by: INTERNAL MEDICINE

## 2020-07-24 PROCEDURE — 6370000000 HC RX 637 (ALT 250 FOR IP): Performed by: INTERNAL MEDICINE

## 2020-07-24 PROCEDURE — 80053 COMPREHEN METABOLIC PANEL: CPT

## 2020-07-24 PROCEDURE — 99233 SBSQ HOSP IP/OBS HIGH 50: CPT | Performed by: FAMILY MEDICINE

## 2020-07-24 PROCEDURE — 94640 AIRWAY INHALATION TREATMENT: CPT

## 2020-07-24 PROCEDURE — 82948 REAGENT STRIP/BLOOD GLUCOSE: CPT

## 2020-07-24 PROCEDURE — 2580000003 HC RX 258: Performed by: FAMILY MEDICINE

## 2020-07-24 PROCEDURE — 36415 COLL VENOUS BLD VENIPUNCTURE: CPT

## 2020-07-24 PROCEDURE — 1200000003 HC TELEMETRY R&B

## 2020-07-24 PROCEDURE — 84295 ASSAY OF SERUM SODIUM: CPT

## 2020-07-24 PROCEDURE — 85025 COMPLETE CBC W/AUTO DIFF WBC: CPT

## 2020-07-24 PROCEDURE — 76870 US EXAM SCROTUM: CPT

## 2020-07-24 PROCEDURE — 83735 ASSAY OF MAGNESIUM: CPT

## 2020-07-24 RX ORDER — IBUPROFEN 600 MG/1
600 TABLET ORAL EVERY 6 HOURS PRN
Status: DISCONTINUED | OUTPATIENT
Start: 2020-07-24 | End: 2020-07-26 | Stop reason: HOSPADM

## 2020-07-24 RX ORDER — DEXTROSE MONOHYDRATE 50 MG/ML
INJECTION, SOLUTION INTRAVENOUS CONTINUOUS
Status: ACTIVE | OUTPATIENT
Start: 2020-07-24 | End: 2020-07-24

## 2020-07-24 RX ADMIN — INSULIN LISPRO 3 UNITS: 100 INJECTION, SOLUTION INTRAVENOUS; SUBCUTANEOUS at 01:58

## 2020-07-24 RX ADMIN — INSULIN LISPRO 6 UNITS: 100 INJECTION, SOLUTION INTRAVENOUS; SUBCUTANEOUS at 17:11

## 2020-07-24 RX ADMIN — INSULIN LISPRO 2 UNITS: 100 INJECTION, SOLUTION INTRAVENOUS; SUBCUTANEOUS at 06:22

## 2020-07-24 RX ADMIN — TAMSULOSIN HYDROCHLORIDE 0.4 MG: 0.4 CAPSULE ORAL at 21:44

## 2020-07-24 RX ADMIN — COSYNTROPIN 250 MCG: 0.25 INJECTION, POWDER, LYOPHILIZED, FOR SOLUTION INTRAMUSCULAR; INTRAVENOUS at 10:25

## 2020-07-24 RX ADMIN — SODIUM CHLORIDE, PRESERVATIVE FREE 10 ML: 5 INJECTION INTRAVENOUS at 20:00

## 2020-07-24 RX ADMIN — ALLOPURINOL 300 MG: 300 TABLET ORAL at 09:13

## 2020-07-24 RX ADMIN — ATORVASTATIN CALCIUM 10 MG: 10 TABLET, FILM COATED ORAL at 21:44

## 2020-07-24 RX ADMIN — IBUPROFEN 600 MG: 600 TABLET, FILM COATED ORAL at 11:41

## 2020-07-24 RX ADMIN — SODIUM CHLORIDE TAB 1 GM 2 G: 1 TAB at 09:13

## 2020-07-24 RX ADMIN — INSULIN LISPRO 3 UNITS: 100 INJECTION, SOLUTION INTRAVENOUS; SUBCUTANEOUS at 23:37

## 2020-07-24 RX ADMIN — ACETAMINOPHEN 650 MG: 325 TABLET ORAL at 21:43

## 2020-07-24 RX ADMIN — POTASSIUM & SODIUM PHOSPHATES POWDER PACK 280-160-250 MG 250 MG: 280-160-250 PACK at 09:13

## 2020-07-24 RX ADMIN — FLUTICASONE PROPIONATE 1 SPRAY: 50 SPRAY, METERED NASAL at 10:25

## 2020-07-24 RX ADMIN — INSULIN LISPRO 3 UNITS: 100 INJECTION, SOLUTION INTRAVENOUS; SUBCUTANEOUS at 11:28

## 2020-07-24 RX ADMIN — LISINOPRIL 20 MG: 20 TABLET ORAL at 09:13

## 2020-07-24 RX ADMIN — FINASTERIDE 5 MG: 5 TABLET, FILM COATED ORAL at 09:13

## 2020-07-24 RX ADMIN — SODIUM BICARBONATE 1300 MG: 650 TABLET ORAL at 21:43

## 2020-07-24 RX ADMIN — SODIUM CHLORIDE TAB 1 GM 2 G: 1 TAB at 11:22

## 2020-07-24 RX ADMIN — DEXTROSE MONOHYDRATE: 50 INJECTION, SOLUTION INTRAVENOUS at 12:06

## 2020-07-24 RX ADMIN — SODIUM CHLORIDE, PRESERVATIVE FREE 10 ML: 5 INJECTION INTRAVENOUS at 10:26

## 2020-07-24 RX ADMIN — POTASSIUM & SODIUM PHOSPHATES POWDER PACK 280-160-250 MG 250 MG: 280-160-250 PACK at 21:43

## 2020-07-24 RX ADMIN — POTASSIUM & SODIUM PHOSPHATES POWDER PACK 280-160-250 MG 250 MG: 280-160-250 PACK at 12:06

## 2020-07-24 RX ADMIN — SODIUM BICARBONATE 1300 MG: 650 TABLET ORAL at 09:13

## 2020-07-24 RX ADMIN — BUDESONIDE AND FORMOTEROL FUMARATE DIHYDRATE 2 PUFF: 160; 4.5 AEROSOL RESPIRATORY (INHALATION) at 09:04

## 2020-07-24 RX ADMIN — ACETAMINOPHEN 650 MG: 325 TABLET ORAL at 06:30

## 2020-07-24 RX ADMIN — POTASSIUM & SODIUM PHOSPHATES POWDER PACK 280-160-250 MG 250 MG: 280-160-250 PACK at 17:10

## 2020-07-24 ASSESSMENT — PAIN SCALES - GENERAL
PAINLEVEL_OUTOF10: 3
PAINLEVEL_OUTOF10: 0
PAINLEVEL_OUTOF10: 3
PAINLEVEL_OUTOF10: 0

## 2020-07-24 ASSESSMENT — PAIN DESCRIPTION - PAIN TYPE: TYPE: ACUTE PAIN

## 2020-07-24 NOTE — PROGRESS NOTES
[facility]   Nephrology progress note  7/24/2020, 9:01 AM      Pt Name:    Junie Stevens  MRN:     [id number]     [place]:    1960  Admit Date:    7/21/2020  3:24 PM  Primary Care Physician:  Jewel Mann DO   Room number  6K-05/005-A    Chief Complaint: Nephrology following for hyponatremia    Subjective:  Patient seen and examined  Seen and examined earlier today  Awake and alert  No acute distress  Discussed with RN  Urine output noted  Received 1 dose of tolvaptan last night    Objective:  24HR INTAKE/OUTPUT:      Intake/Output Summary (Last 24 hours) at 7/24/2020 0901  Last data filed at 7/24/2020 0327  Gross per 24 hour   Intake 930 ml   Output 2825 ml   Net -1895 ml     I/O last 3 completed shifts: In: 971.6 [P.O.:850; I.V.:80; IV Piggyback:41.6]  Out: 2825 [Urine:2825]  No intake/output data recorded. Admission weight: 285 lb (129.3 kg)  Wt Readings from Last 3 Encounters:   07/24/20 293 lb 3.4 oz (133 kg)     Body mass index is 39.77 kg/m².     Physical examination  VITALS:     Vitals:    07/23/20 2330 07/24/20 0300 07/24/20 0500 07/24/20 0615   BP: (!) 120/91 (!) 113/49     Pulse: 95 84     Resp: 18 16     Temp: 98.5 °F (36.9 °C) 101.3 °F (38.5 °C) 98.8 °F (37.1 °C) 100.4 °F (38 °C)   TempSrc: Oral Rectal Oral Oral   SpO2: 97% 90%     Weight:  293 lb 3.4 oz (133 kg)     Height:         General Appearance: alert and cooperative with exam, appears comfortable, no distress  Mouth/Throat: Oral mucosa moist  Neck: No JVD  Lungs: Air entry B/L, no rales, no use of accessory muscles  Heart:  S1, S2 heard  GI: soft, non-tender, no guarding  Extremities: No LE edema      Lab Data  CBC:   Recent Labs     07/22/20  0339 07/23/20  0530 07/24/20  0500   WBC 12.2* 9.8 7.8   HGB 13.5* 13.2* 13.6*   HCT 37.5* 36.8* 38.4*    172 161     BMP:  Recent Labs     07/22/20  0339  07/22/20 2010 07/23/20  0530  07/24/20  0307 07/24/20  0500 07/24/20  0650   *   < > 119*  119* hours  Check sodium level 1 hour after infusion is complete and monitor and adjust depending on next sodium level  We will stop salt tablets for now    2. Mild metabolic acidosis: On sodium bicarbonate, improving  3. Hypophosphatemia. Improved with replacement  4. Pituitary lesion  5. Hyperglycemia  6. Hypertension.   Continue with lisinopril    D/W patient and RN    Dung Sal MD  [facility]

## 2020-07-24 NOTE — CARE COORDINATION
7/24/20, 9:25 AM EDT    DISCHARGE ONGOING EVALUATION:     Jesenia Grace day: 3  Location: [facility]05/005- Reason for admit: Hyponatremia [E87.1]   Treatment Plan of Care: Received as transfer from ICU. Na+ 129, Ca+ 8.0. T100.4. Tachy. Cortisol test this a.m. Lyte replacement. Nephrology following.    Barriers to Discharge: not medically stable  PCP: Susan Howard DO  Readmission Risk Score: 16%  Patient Goals/Plan/Treatment Preferences: from home alone with family support

## 2020-07-24 NOTE — PROGRESS NOTES
Hospitalist Progress Note    Patient:  [facility]      Unit/Bed:6K-05/005-A    YOB: 1960    MRN: [id number]       Acct: [de-identified]     PCP: Zeny Dudley DO    Date of Admission: 7/21/2020    Assessment/Plan:    Anticipated Discharge in :     GRAEME/Wyatt Paul [address]6 Problems    Diagnosis Date Noted    Pituitary adenoma [facility]) [D35.2] 07/23/2020    Other headache syndrome [G44.89] 07/23/2020    Pituitary apoplexy ([facility] [address] [E23.6] 07/23/2020    Hypertensive urgency [I16.0]     Hyponatremia [E87.1] 07/21/2020     Hyponatremia suggestive of SIADH  Urine osmolality 784. Urine sodium 172. Lowest sodium documented was 118  He failed to comply with fluid restriction. Nephrology consulted  Patient received hypertonic saline in ICU, also received tolvaptan  Serum sodium improving, now at 130 (from 123). Salt tabs stopped for now. Given D5W for 3 hours to prevent overcorrection on 7/24/2020  Maximum correction should be limited to 8meqs/24 hours    Pituitary macroadenoma, 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 Superiorly the lesion contacts the optic chiasm. neurology and neurosurgery consulted  Order serum cortisol at 8AM tomorrow, LH, FSH, somatomedin, testosterone and prolactin to determine if this is a functioning adenoma  Consult endocrinology  Neurosurgery would like to discuss first with Endo, not anticipating any surgery at this time    Low cortisol rule out adrenal insufficiency  ACTH pending  Cosyntropin stimulation test pending. Currently not receiving systemic steroids.   Endo consult    Testicular enlargement, R  Noted to be getting bigger over the past few months  Seen a Urologist in [place] in the past, no intervention done then  Obtain a scrotal US with doppler  Consult Urology    Diabetes mellitus  Takes dextrose       Scheduled Medications    sodium bicarbonate  1,300 mg Oral BID    potassium & sodium phosphates  1 packet Oral 4x Daily    insulin lispro  0-6 Units Subcutaneous Q6H    allopurinol  300 mg Oral Daily    lisinopril  20 mg Oral Daily    finasteride  5 mg Oral Daily    fluticasone  1 spray Each Nostril Daily    budesonide-formoterol  2 puff Inhalation BID    atorvastatin  10 mg Oral Nightly    tamsulosin  0.4 mg Oral Nightly    sodium chloride flush  10 mL Intravenous 2 times per day    [Held by provider] enoxaparin  40 mg Subcutaneous Daily     PRN Meds: ibuprofen, hydrALAZINE, glucose, dextrose, glucagon (rDNA), dextrose, metoprolol, sodium chloride flush, acetaminophen **OR** acetaminophen, polyethylene glycol, promethazine **OR** ondansetron      Intake/Output Summary (Last 24 hours) at 7/24/2020 1931  Last data filed at 7/24/2020 1522  Gross per 24 hour   Intake 1365.02 ml   Output 3625 ml   Net -2259.98 ml       Diet:  DIET CARB CONTROL; Exam:  /76   Pulse 83   Temp 98.4 °F (36.9 °C) (Oral)   Resp 18   Ht 6' (1.829 m)   Wt 293 lb 3.4 oz (133 kg)   SpO2 95%   BMI 39.77 kg/m²     General appearance: No apparent distress, appears stated age and cooperative. Morbidly obese  HEENT: Pupils equal, round, and reactive to light. Conjunctivae/corneas clear. Neck: Supple, with full range of motion. No jugular venous distention. Trachea midline. Respiratory:  Normal respiratory effort. Clear to auscultation, bilaterally without Rales/Wheezes/Rhonchi. Cardiovascular: Regular rate and rhythm with normal S1/S2 without murmurs, rubs or gallops. Abdomen: Soft, non-tender, non-distended with normal bowel sounds. : testicular enlargement R>L, non tender on palpation  Musculoskeletal: passive and active ROM x 4 extremities. Skin: Skin color, texture, turgor normal.  No rashes or lesions. Neurologic:  Neurovascularly intact without any focal sensory/motor deficits.  Cranial nerves: II-XII intact, grossly non-focal.  Psychiatric: Alert and oriented, thought content appropriate, normal insight  Capillary Refill: Brisk,< 3 seconds   Peripheral Pulses: +2 palpable, equal bilaterally       Labs:   Recent Labs     07/22/20 0339 07/23/20  0530 07/24/20  0500   WBC 12.2* 9.8 7.8   HGB 13.5* 13.2* 13.6*   HCT 37.5* 36.8* 38.4*    172 161     Recent Labs     07/22/20 0339 07/22/20 2010 07/23/20  0530 07/24/20  0500 07/24/20  0650 07/24/20  1030 07/24/20  1640   *   < > 119*  119*   < > 124*   < > 126* 129* 130* 124*   K 4.4  --  4.0  --  3.8  --  4.0  --   --   --    CL 90*  --  86*  --  93*  --  96*  --   --   --    CO2 20*  --  19*  --  18*  --  20*  --   --   --    BUN 10  --  10  --  8  --  10  --   --   --    CREATININE 0.6  --  0.8  --  0.7  --  1.0  --   --   --    CALCIUM 7.7*  --  7.7*  --  7.5*  --  8.0*  --   --   --    PHOS 2.4  --   --   --  1.9*  --  2.5  --   --   --     < > = values in this interval not displayed. Recent Labs     07/22/20 0339 07/23/20 0530 07/24/20  0500   AST 23 24 21   ALT 20 19 18   BILITOT 0.7 0.8 0.6   ALKPHOS 53 53 55     No results for input(s): INR in the last 72 hours. No results for input(s): Placido Chavez in the last 72 hours.     Urinalysis:      Lab Results   Component Value Date    NITRU NEGATIVE 07/23/2020    WBCUA 0-2 07/23/2020    BACTERIA NONE 07/23/2020    RBCUA 5-10 07/23/2020    BLOODU LARGE 07/23/2020    SPECGRAV >=1.030 07/23/2020         Diet: DIET CARB CONTROL;    DVT prophylaxis: [] Lovenox                                 [] SCDs                                 [] SQ Heparin                                 [] Encourage ambulation           [] Already on Anticoagulation     Disposition:    [] Home       [] TCU       [] Rehab       [] Psych       [] SNF       [] [place]       [] Other-    Code Status: Full Code    PT/OT Eval Status:         Electronically signed by Classie Sacks, MD on 7/24/2020 at 7:31 PM

## 2020-07-24 NOTE — PLAN OF CARE
Problem: Pain:  Goal: Pain level will decrease  Description: Patient complained of pain in head rating it a 6 on the 0-10 scale. Pain medication given as ordered and needed. Patient voiced satisfaction with this. Also instructed patient on distraction, repositioning, and ambulation as non-pharmacological methods of pain relief. Patient voiced understanding. Outcome: Ongoing  Note: Pt complaining of 3/10 head pain radiating towards back of eyes. Ice applied and pt satisfied. Problem: Nausea/Vomiting:  Goal: Absence of nausea/vomiting  Description: Patient complaining of nausea and dry-heaving. Phenergan given PO. Outcome: Ongoing  Note: No N&V at this time. Problem: Physical Regulation:  Goal: Will show no signs and symptoms of electrolyte imbalance  Description: Results for Marianna De Anda (MRN [id number]) as of 7/22/2020 04:51    7/22/2020 03:39  Sodium: 122 (L)    Outcome: Ongoing  Note: Sodium levels still low. Redraws Q2 per [place]. Problem: Physical Regulation:  Goal: Ability to maintain a body temperature in the normal range will improve  Description: Ability to maintain a body temperature in the normal range will improve  Outcome: Ongoing  Note: Pt had temp of 102 during night. Tylenol given and Ice packs applied. Problem: Discharge Planning:  Goal: Discharged to appropriate level of care  Description: Discharged to appropriate level of care  Outcome: Ongoing  Note: Pt's preference is to be d/c home when medically stable. Care plan reviewed with patient. Patient verbalizes understanding of the plan of care and contribute to goal setting.

## 2020-07-25 VITALS
DIASTOLIC BLOOD PRESSURE: 57 MMHG | OXYGEN SATURATION: 93 % | SYSTOLIC BLOOD PRESSURE: 102 MMHG | HEART RATE: 84 BPM | BODY MASS INDEX: 38.91 KG/M2 | TEMPERATURE: 98.1 F | WEIGHT: 287.3 LBS | HEIGHT: 72 IN | RESPIRATION RATE: 18 BRPM

## 2020-07-25 LAB
ALBUMIN SERPL-MCNC: 2.8 G/DL (ref 3.5–5.1)
ALP BLD-CCNC: 48 U/L (ref 38–126)
ALT SERPL-CCNC: 20 U/L (ref 11–66)
ANION GAP SERPL CALCULATED.3IONS-SCNC: 10 MEQ/L (ref 8–16)
ANION GAP SERPL CALCULATED.3IONS-SCNC: 9 MEQ/L (ref 8–16)
AST SERPL-CCNC: 26 U/L (ref 5–40)
AVERAGE GLUCOSE: 285 MG/DL (ref 70–126)
BASOPHILS # BLD: 0.5 %
BASOPHILS ABSOLUTE: 0 THOU/MM3 (ref 0–0.1)
BILIRUB SERPL-MCNC: 0.4 MG/DL (ref 0.3–1.2)
BUN BLDV-MCNC: 15 MG/DL (ref 7–22)
CALCIUM SERPL-MCNC: 7.8 MG/DL (ref 8.5–10.5)
CHLORIDE BLD-SCNC: 95 MEQ/L (ref 98–111)
CHLORIDE BLD-SCNC: 99 MEQ/L (ref 98–111)
CO2: 23 MEQ/L (ref 23–33)
CO2: 23 MEQ/L (ref 23–33)
CORTISOL COLLECTION INFO: NORMAL
CORTISOL: 2.5 UG/DL
CREAT SERPL-MCNC: 1.1 MG/DL (ref 0.4–1.2)
EOSINOPHIL # BLD: 3.6 %
EOSINOPHILS ABSOLUTE: 0.2 THOU/MM3 (ref 0–0.4)
ERYTHROCYTE [DISTWIDTH] IN BLOOD BY AUTOMATED COUNT: 12.3 % (ref 11.5–14.5)
ERYTHROCYTE [DISTWIDTH] IN BLOOD BY AUTOMATED COUNT: 40.5 FL (ref 35–45)
FOLLICLE STIMULATING HORMONE: 1.9 MIU/ML (ref 0.9–11)
GFR SERPL CREATININE-BSD FRML MDRD: 68 ML/MIN/1.73M2
GLUCOSE BLD-MCNC: 211 MG/DL (ref 70–108)
GLUCOSE BLD-MCNC: 228 MG/DL (ref 70–108)
GLUCOSE BLD-MCNC: 251 MG/DL (ref 70–108)
GLUCOSE BLD-MCNC: 290 MG/DL (ref 70–108)
GLUCOSE BLD-MCNC: 328 MG/DL (ref 70–108)
HBA1C MFR BLD: 11.5 % (ref 4.4–6.4)
HCT VFR BLD CALC: 33.7 % (ref 42–52)
HEMOGLOBIN: 11.9 GM/DL (ref 14–18)
IMMATURE GRANS (ABS): 0.04 THOU/MM3 (ref 0–0.07)
IMMATURE GRANULOCYTES: 0.6 %
LUTEINIZING HORMONE: 1.4 MIU/ML (ref 0.6–6.3)
LYMPHOCYTES # BLD: 9 %
LYMPHOCYTES ABSOLUTE: 0.6 THOU/MM3 (ref 1–4.8)
MAGNESIUM: 2 MG/DL (ref 1.6–2.4)
MCH RBC QN AUTO: 32.1 PG (ref 26–33)
MCHC RBC AUTO-ENTMCNC: 35.3 GM/DL (ref 32.2–35.5)
MCV RBC AUTO: 90.8 FL (ref 80–94)
MONOCYTES # BLD: 13.8 %
MONOCYTES ABSOLUTE: 0.9 THOU/MM3 (ref 0.4–1.3)
NUCLEATED RED BLOOD CELLS: 0 /100 WBC
OSMOLALITY: 278 MOSMOL/KG (ref 275–295)
PHOSPHORUS: 3.3 MG/DL (ref 2.4–4.7)
PLATELET # BLD: 168 THOU/MM3 (ref 130–400)
PMV BLD AUTO: 9.6 FL (ref 9.4–12.4)
POTASSIUM REFLEX MAGNESIUM: 4.3 MEQ/L (ref 3.5–5.2)
POTASSIUM SERPL-SCNC: 4.1 MEQ/L (ref 3.5–5.2)
PROLACTIN: 1.3 NG/ML
RBC # BLD: 3.71 MILL/MM3 (ref 4.7–6.1)
SEG NEUTROPHILS: 72.5 %
SEGMENTED NEUTROPHILS ABSOLUTE COUNT: 4.6 THOU/MM3 (ref 1.8–7.7)
SODIUM BLD-SCNC: 128 MEQ/L (ref 135–145)
SODIUM BLD-SCNC: 131 MEQ/L (ref 135–145)
T4 FREE: 0.95 NG/DL (ref 0.93–1.76)
TOTAL PROTEIN: 5.2 G/DL (ref 6.1–8)
URINE CULTURE, ROUTINE: NORMAL
WBC # BLD: 6.4 THOU/MM3 (ref 4.8–10.8)

## 2020-07-25 PROCEDURE — 84146 ASSAY OF PROLACTIN: CPT

## 2020-07-25 PROCEDURE — 99239 HOSP IP/OBS DSCHRG MGMT >30: CPT | Performed by: FAMILY MEDICINE

## 2020-07-25 PROCEDURE — 84403 ASSAY OF TOTAL TESTOSTERONE: CPT

## 2020-07-25 PROCEDURE — 6370000000 HC RX 637 (ALT 250 FOR IP): Performed by: FAMILY MEDICINE

## 2020-07-25 PROCEDURE — 36415 COLL VENOUS BLD VENIPUNCTURE: CPT

## 2020-07-25 PROCEDURE — 6370000000 HC RX 637 (ALT 250 FOR IP): Performed by: INTERNAL MEDICINE

## 2020-07-25 PROCEDURE — 82948 REAGENT STRIP/BLOOD GLUCOSE: CPT

## 2020-07-25 PROCEDURE — 94760 N-INVAS EAR/PLS OXIMETRY 1: CPT

## 2020-07-25 PROCEDURE — 85025 COMPLETE CBC W/AUTO DIFF WBC: CPT

## 2020-07-25 PROCEDURE — 84439 ASSAY OF FREE THYROXINE: CPT

## 2020-07-25 PROCEDURE — 99253 IP/OBS CNSLTJ NEW/EST LOW 45: CPT | Performed by: UROLOGY

## 2020-07-25 PROCEDURE — 83930 ASSAY OF BLOOD OSMOLALITY: CPT

## 2020-07-25 PROCEDURE — 82024 ASSAY OF ACTH: CPT

## 2020-07-25 PROCEDURE — 83036 HEMOGLOBIN GLYCOSYLATED A1C: CPT

## 2020-07-25 PROCEDURE — 99232 SBSQ HOSP IP/OBS MODERATE 35: CPT | Performed by: INTERNAL MEDICINE

## 2020-07-25 PROCEDURE — 80053 COMPREHEN METABOLIC PANEL: CPT

## 2020-07-25 PROCEDURE — 1200000003 HC TELEMETRY R&B

## 2020-07-25 PROCEDURE — 80051 ELECTROLYTE PANEL: CPT

## 2020-07-25 PROCEDURE — 2580000003 HC RX 258: Performed by: FAMILY MEDICINE

## 2020-07-25 PROCEDURE — 83001 ASSAY OF GONADOTROPIN (FSH): CPT

## 2020-07-25 PROCEDURE — 82533 TOTAL CORTISOL: CPT

## 2020-07-25 PROCEDURE — 83735 ASSAY OF MAGNESIUM: CPT

## 2020-07-25 PROCEDURE — 83002 ASSAY OF GONADOTROPIN (LH): CPT

## 2020-07-25 PROCEDURE — 84100 ASSAY OF PHOSPHORUS: CPT

## 2020-07-25 PROCEDURE — 84305 ASSAY OF SOMATOMEDIN: CPT

## 2020-07-25 PROCEDURE — 94640 AIRWAY INHALATION TREATMENT: CPT

## 2020-07-25 RX ORDER — SODIUM BICARBONATE 650 MG/1
650 TABLET ORAL 2 TIMES DAILY
Status: DISCONTINUED | OUTPATIENT
Start: 2020-07-25 | End: 2020-07-26 | Stop reason: HOSPADM

## 2020-07-25 RX ORDER — LEVOTHYROXINE SODIUM 0.07 MG/1
75 TABLET ORAL EVERY MORNING
Status: DISCONTINUED | OUTPATIENT
Start: 2020-07-25 | End: 2020-07-26 | Stop reason: HOSPADM

## 2020-07-25 RX ORDER — SODIUM CHLORIDE 1000 MG
1 TABLET, SOLUBLE MISCELLANEOUS
Status: DISCONTINUED | OUTPATIENT
Start: 2020-07-25 | End: 2020-07-26 | Stop reason: HOSPADM

## 2020-07-25 RX ORDER — INSULIN GLARGINE 100 [IU]/ML
30 INJECTION, SOLUTION SUBCUTANEOUS DAILY
Status: DISCONTINUED | OUTPATIENT
Start: 2020-07-25 | End: 2020-07-26 | Stop reason: HOSPADM

## 2020-07-25 RX ADMIN — LISINOPRIL 20 MG: 20 TABLET ORAL at 08:10

## 2020-07-25 RX ADMIN — INSULIN GLARGINE 30 UNITS: 100 INJECTION, SOLUTION SUBCUTANEOUS at 18:50

## 2020-07-25 RX ADMIN — INSULIN LISPRO 3 UNITS: 100 INJECTION, SOLUTION INTRAVENOUS; SUBCUTANEOUS at 21:11

## 2020-07-25 RX ADMIN — SODIUM CHLORIDE, PRESERVATIVE FREE 10 ML: 5 INJECTION INTRAVENOUS at 21:11

## 2020-07-25 RX ADMIN — PROMETHAZINE HYDROCHLORIDE 12.5 MG: 25 TABLET ORAL at 03:39

## 2020-07-25 RX ADMIN — FLUTICASONE PROPIONATE 1 SPRAY: 50 SPRAY, METERED NASAL at 08:10

## 2020-07-25 RX ADMIN — TAMSULOSIN HYDROCHLORIDE 0.4 MG: 0.4 CAPSULE ORAL at 20:55

## 2020-07-25 RX ADMIN — SODIUM BICARBONATE 650 MG: 650 TABLET ORAL at 20:55

## 2020-07-25 RX ADMIN — IBUPROFEN 600 MG: 600 TABLET, FILM COATED ORAL at 03:33

## 2020-07-25 RX ADMIN — ALLOPURINOL 300 MG: 300 TABLET ORAL at 08:10

## 2020-07-25 RX ADMIN — LEVOTHYROXINE SODIUM 75 MCG: 75 TABLET ORAL at 17:26

## 2020-07-25 RX ADMIN — BUDESONIDE AND FORMOTEROL FUMARATE DIHYDRATE 2 PUFF: 160; 4.5 AEROSOL RESPIRATORY (INHALATION) at 17:22

## 2020-07-25 RX ADMIN — SODIUM CHLORIDE, PRESERVATIVE FREE 10 ML: 5 INJECTION INTRAVENOUS at 08:10

## 2020-07-25 RX ADMIN — SODIUM BICARBONATE 1300 MG: 650 TABLET ORAL at 08:10

## 2020-07-25 RX ADMIN — ATORVASTATIN CALCIUM 10 MG: 10 TABLET, FILM COATED ORAL at 20:55

## 2020-07-25 RX ADMIN — FINASTERIDE 5 MG: 5 TABLET, FILM COATED ORAL at 08:10

## 2020-07-25 RX ADMIN — ACETAMINOPHEN 650 MG: 325 TABLET ORAL at 17:26

## 2020-07-25 RX ADMIN — BUDESONIDE AND FORMOTEROL FUMARATE DIHYDRATE 2 PUFF: 160; 4.5 AEROSOL RESPIRATORY (INHALATION) at 08:05

## 2020-07-25 RX ADMIN — POTASSIUM & SODIUM PHOSPHATES POWDER PACK 280-160-250 MG 250 MG: 280-160-250 PACK at 08:10

## 2020-07-25 RX ADMIN — HYDROCORTISONE 12.5 MG: 10 TABLET ORAL at 20:55

## 2020-07-25 RX ADMIN — SODIUM CHLORIDE TAB 1 GM 1 G: 1 TAB at 17:26

## 2020-07-25 RX ADMIN — POTASSIUM & SODIUM PHOSPHATES POWDER PACK 280-160-250 MG 250 MG: 280-160-250 PACK at 12:38

## 2020-07-25 ASSESSMENT — PAIN SCALES - GENERAL
PAINLEVEL_OUTOF10: 0
PAINLEVEL_OUTOF10: 6
PAINLEVEL_OUTOF10: 0

## 2020-07-25 NOTE — FLOWSHEET NOTE
07/25/20 0006   Provider Notification   Reason for Communication Evaluate   Provider Name [address]   Provider Notification Physician   Method of Communication Secure Message   Response See orders   Notification Time [id number]   Notified: Sodium 129 B. S @[phone] B. S @ [phone]  She responded no changes

## 2020-07-25 NOTE — CONSULTS
exertion   Gastrointestinal ROS: no abdominal pain, change in bowel habits, or black or bloody stools   Genito-Urinary ROS: no dysuria, trouble voiding, or hematuria   Musculoskeletal ROS: negative   Neurological ROS: no TIA or stroke symptoms . Has frontal headache with blurred vision right side  Dermatological ROS: negative    Past Medical, Surgical, Family, Social and Allergy Histories:  He lives alone and has his vijay as a power of . He works in a factory. eats mostly frozen dinners. Diabetes Mellitus type two uncontrolled. I have reviewed the patient's medical history in detail and updated the computerized patient record. has a past medical history of Asthma, Diabetes mellitus ([address] and Gout.   has a past surgical history that includes Carpal tunnel release (Bilateral). reports that he has never smoked. He has never used smokeless tobacco. He reports previous alcohol use. He reports previous drug use.  family history is not on file.   No Known Allergies  Current Facility-Administered Medications   Medication Dose Route Frequency Provider Last Rate Last Dose    insulin lispro (HUMALOG) injection vial 0-12 Units  0-12 Units Subcutaneous TID  Hugo Harp MD   6 Units at 07/25/20 1238    insulin lispro (HUMALOG) injection vial 0-6 Units  0-6 Units Subcutaneous Nightly Hugo Harp MD        sodium bicarbonate tablet 650 mg  650 mg Oral BID Ambar Putnam DO        sodium chloride tablet 1 g  1 g Oral TID  Ambar Putnam DO        ibuprofen (ADVIL;MOTRIN) tablet 600 mg  600 mg Oral Q6H PRN Hugo Harp MD   600 mg at 07/25/20 0254    hydrALAZINE (APRESOLINE) injection 10 mg  10 mg Intravenous Q6H PRN ADRY Wright - CNP   10 mg at 07/23/20 0238    potassium & sodium phosphates (PHOS-NAK) 280-160-250 MG packet 250 mg  1 packet Oral 4x Daily Marion Johnson MD   250 mg at 07/25/20 1238    glucose (GLUTOSE) 40 % oral gel 15 g  15 g Oral PRN Yara MD Renee        dextrose 50 % IV solution  12.5 g Intravenous PRN Yara Duran MD        glucagon (rDNA) injection 1 mg  1 mg Intramuscular PRN Yara Duran MD        dextrose 5 % solution  100 mL/hr Intravenous PRN Yara Duran MD        metoprolol (LOPRESSOR) injection 5 mg  5 mg Intravenous Q6H PRN Michelle Herron MD   5 mg at 07/22/20 2131    allopurinol (ZYLOPRIM) tablet 300 mg  300 mg Oral Daily Yara Duran MD   300 mg at 07/25/20 0810    lisinopril (PRINIVIL;ZESTRIL) tablet 20 mg  20 mg Oral Daily Yara Duran MD   20 mg at 07/25/20 0810    finasteride (PROSCAR) tablet 5 mg  5 mg Oral Daily Yara Duran MD   5 mg at 07/25/20 0810    fluticasone (FLONASE) 50 MCG/ACT nasal spray 1 spray  1 spray Each Nostril Daily Yara Duran MD   1 spray at 07/25/20 0810    budesonide-formoterol (SYMBICORT) 160-4.5 MCG/ACT inhaler 2 puff  2 puff Inhalation BID Starr Naidu MD   2 puff at 07/25/20 0805    atorvastatin (LIPITOR) tablet 10 mg  10 mg Oral Nightly Starr Naidu MD   10 mg at 07/24/20 2144    tamsulosin (FLOMAX) capsule 0.4 mg  0.4 mg Oral Nightly Yara Duran MD   0.4 mg at 07/24/20 2144    sodium chloride flush 0.9 % injection 10 mL  10 mL Intravenous 2 times per day Starr Naidu MD   10 mL at 07/25/20 0810    sodium chloride flush 0.9 % injection 10 mL  10 mL Intravenous PRN Yara Duran MD   10 mL at 07/22/20 0022    acetaminophen (TYLENOL) tablet 650 mg  650 mg Oral Q6H PRN Yara Duran MD   650 mg at 07/24/20 2143    Or    acetaminophen (TYLENOL) suppository 650 mg  650 mg Rectal Q6H PRN Yara Duran MD        polyethylene glycol (GLYCOLAX) packet 17 g  17 g Oral Daily PRN Yara Duran MD        promethazine (PHENERGAN) tablet 12.5 mg  12.5 mg Oral Q6H PRN Yara Duran MD   12.5 mg at 07/25/20 0339    Or    ondansetron (ZOFRAN) injection 4 mg  4 mg

## 2020-07-25 NOTE — PLAN OF CARE
Problem: Pain:  Description: Pain management should include both nonpharmacologic and pharmacologic interventions. Goal: Control of acute pain  Description: Patient complained of pain in head rating it a 6 on the 0-10 scale. Pain medication given as ordered and needed. Patient voiced satisfaction with this. Also instructed patient on distraction, repositioning, and ambulation as non-pharmacological methods of pain relief. Patient voiced understanding. Note: Patient able to use 0-10 pain scale. Denies pain at this time. Agreeable to take PRN pain medications. Problem: Nausea/Vomiting:  Goal: Absence of nausea/vomiting  Description: Patient complaining of nausea and dry-heaving. Phenergan given PO. Outcome: Ongoing  Note: Patient has complained of upset stomach during my shift and isn't interested in antiemetic at this time. Problem: Skin Integrity:  Goal: Absence of new skin breakdown  Description: Patient turns self. Reminded patient to turn every two hours. Outcome: Ongoing  Note: No signs of new skin breakdown with each assessment. Skin remains warm, dry, intact. Mucous membranes pink & moist. Patient is able to turn self. Problem: Physical Regulation:  Goal: Ability to maintain a body temperature in the normal range will improve  Description: Ability to maintain a body temperature in the normal range will improve  Outcome: Ongoing  Note: Patient's electrolytes are being monitored for any imbalances and being corrected as needed. Temps are also being monitored to prevent an elevation with tylenol and ibuprofen available as needed. Problem: Discharge Planning:  Goal: Discharged to appropriate level of care  Description: Discharged to appropriate level of care  Note: Patient plans to be discharge to private home alone, but with family support.      Problem: Fluid Volume:  Goal: Maintenance of adequate hydration will improve  Description: Maintenance of adequate hydration will

## 2020-07-25 NOTE — PROGRESS NOTES
Renal Progress Note  [facility]    Patient :  yAaz Guzman; 60 y.o. MRN# [id number]  Location:  [facility]05/005-A  Attending:  Nenita Power MD  Admit Date:  7/21/2020   Hospital Day: 4      Subjective:     Nephrology is following the patient for hyponatremia. Patient seen and examined. Serum sodium better today at 131. Blood sugar has been variable. Patient has pain around ramirez cath and wants it out.      Outpatient Medications:     Medications Prior to Admission: fluticasone (FLONASE) 50 MCG/ACT nasal spray, 1 spray by Each Nostril route daily 1-2 sprays in each nostril daily  tamsulosin (FLOMAX) 0.4 MG capsule, Take 0.4 mg by mouth nightly  benazepril (LOTENSIN) 20 MG tablet, Take 20 mg by mouth daily  allopurinol (ZYLOPRIM) 300 MG tablet, Take 300 mg by mouth daily  simvastatin (ZOCOR) 20 MG tablet, Take 20 mg by mouth nightly  glimepiride (AMARYL) 4 MG tablet, Take 4 mg by mouth every morning (before breakfast)  metFORMIN (GLUCOPHAGE) 1000 MG tablet, Take 500 mg by mouth 2 times daily (with meals)  Fluticasone furoate-vilanterol (BREO ELLIPTA) 200-25 MCG/INH AEPB inhaler, Inhale 1 puff into the lungs daily  albuterol sulfate HFA (PROAIR HFA) 108 (90 Base) MCG/ACT inhaler, Inhale 1 puff into the lungs every 4 hours as needed for Wheezing  finasteride (PROSCAR) 5 MG tablet, Take 5 mg by mouth daily    Current Medications:     Scheduled Meds:    insulin lispro  0-12 Units Subcutaneous TID WC    insulin lispro  0-6 Units Subcutaneous Nightly    sodium bicarbonate  650 mg Oral BID    sodium chloride  1 g Oral TID     potassium & sodium phosphates  1 packet Oral 4x Daily    allopurinol  300 mg Oral Daily    lisinopril  20 mg Oral Daily    finasteride  5 mg Oral Daily    fluticasone  1 spray Each Nostril Daily    budesonide-formoterol  2 puff Inhalation BID    atorvastatin  10 mg Oral Nightly    tamsulosin  0.4 mg Oral Nightly    sodium chloride flush  10 mL Intravenous 2 times per day   [facility] by provider] enoxaparin  40 mg Subcutaneous Daily     Continuous Infusions:    dextrose       PRN Meds:  ibuprofen, hydrALAZINE, glucose, dextrose, glucagon (rDNA), dextrose, metoprolol, sodium chloride flush, acetaminophen **OR** acetaminophen, polyethylene glycol, promethazine **OR** ondansetron    Input/Output:       I/O last 3 completed shifts: In: 0592 [P.O.:1355; I.V.:375]  Out: 2225 [Urine:2225]. Patient Vitals for the past 96 hrs (Last 3 readings):   Weight   20 0322 287 lb 4.8 oz (130.3 kg)   20 0300 293 lb 3.4 oz (133 kg)   20 1815 295 lb 6.7 oz (134 kg)       Vital Signs:   Temperature:  Temp: 97.7 °F (36.5 °C)  TMax:   Temp (24hrs), Av.5 °F (36.9 °C), Min:97.7 °F (36.5 °C), Max:99.8 °F (37.7 °C)    Respirations:  Resp: 18  Pulse:   Pulse: 84  BP:    BP: (!) 95/55  BP Range: Systolic (97QDT), KGR:645 , Min:88 , CEF:496       Diastolic (46FRG), LQV:78, Min:50, Max:94      Physical Examination:     General:  Awake, alert, no acute distress  HEENT: NC/AT/ MMM  Chest:               Clear B/L no W/R/R  Cardiac:  S1 S2   Abdomen: Soft, non-tender,  Neuro:  No facial droop, No Asterixis  SKIN:  No rashes, good skin turgor.   Extremities:  trace edema, no cyanosis    Labs:       Recent Labs     20  0530 20  0500 20  0515   WBC 9.8 7.8 6.4   RBC 4.13* 4.28* 3.71*   HGB 13.2* 13.6* 11.9*   HCT 36.8* 38.4* 33.7*   MCV 89.1 89.7 90.8   MCH 32.0 31.8 32.1   MCHC 35.9* 35.4 35.3    161 168   MPV 9.5 9.2* 9.6      BMP:   Recent Labs     20  0530  20  0500  20  1640 20  2155 20  0515   *   < > 126*   < > 124* 129* 131*   K 3.8  --  4.0  --   --   --  4.1   CL 93*  --  96*  --   --   --  99   CO2 18*  --  20*  --   --   --  23   BUN 8  --  10  --   --   --  15   CREATININE 0.7  --  1.0  --   --   --  1.1   GLUCOSE 212*  --  227*  --   --   --  228*   CALCIUM 7.5*  --  8.0*  --   --   --  7.8*    < > = values in Metabolic acidosis: improved, reduce bicarb to 650 mg BID  3. HTN; controlled  4. Pituitary adenoma  5. DM with hyperglycemia  6. Hypophosphatemia, better        Please don't hesitate to call with any questions.   Electronically signed by Kevin Cobb DO on 7/25/2020 at 1:50 PM

## 2020-07-25 NOTE — PROGRESS NOTES
Hospitalist Progress Note    Patient:  Maria Teresa Steinberg      Unit/Bed:6K-05/005-A    YOB: 1960    MRN: [id number]       Acct: [de-identified]     PCP: Leticia Dudley DO    Date of Admission: 7/21/2020    Assessment/Plan:    Anticipated Discharge in :     GRAEME/[facility]6 Problems    Diagnosis Date Noted    Pituitary adenoma [facility]) [D35.2] 07/23/2020    Other headache syndrome [G44.89] 07/23/2020    Pituitary apoplexy (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 Superiorly the lesion contacts the optic chiasm. neurology and neurosurgery consulted  pending serum cortisol, LH, FSH, somatomedin, testosterone and prolactin to determine if this is a functioning adenoma  Consult endocrinology    Low cortisol rule out adrenal insufficiency  ACTH pending   Cosyntropin stimulation test pending. Currently not receiving systemic steroids, consider starting hydrocortisone  Endo consult    Hyponatremia suggestive of SIADH  Urine osmolality 784. Urine sodium 172. Lowest sodium documented was 118  He failed to comply with fluid restriction. Nephrology consulted  Patient received hypertonic saline in ICU, also received tolvaptan  Serum sodium improving, now at 130 (from 123). Salt tabs stopped for now.  Given D5W for 3 hours to prevent overcorrection on 7/24/2020  Maximum correction should be limited to 8meqs/24 hour    Testicular enlargement, R  Noted to be getting bigger over the past few months  Seen a Urologist in [facility] in the past, no intervention done then  Testicular US showed Very large complex right hydrocele, Moderate to large left hydrocele with debris, 1.5 x 0.8 x 0.8 cm left epididymal head cyst or spermatocele with a small amount of debris. Consult Urology    Diabetes mellitus Type II, uncontrolled  Takes metformin at home  A1C 11.5%  Minimal proteinuria . On lisinopril for renal protection. Start moderate dose Humalog SS    Hypertension  Lisinopril. BP controlled    Hyperlipidemia  Continue Statin therapy    Gout  Prophylaxis with allopurinol. BPH  Continue Finasteride and Flomax. Fever  Has been occurring occasionally  May be related to adrenal insufficiency or pituitary apoplexy. No evidence of active infection necessitating antibiotic treatment  Continue to monitor  Add ibuprofen on top of tylenol prn for fever    Morbid obesity  Diet and lifestyle modification    Addendum  Discussed with Dr. Kofi Garcia and he suggests immediate transfer to a tertiary facility  He and his sister chose [facility]  Transfer initiated and discussed with Transfer center    Chief Complaint:   hyponatremia    Hospital Course:   Patient is a 76-year-old white male lifetime non-smoker. He is morbidly obese. He does have impulsivity complex and spontaneity. He carries a diagnosis of gout, diabetes mellitus, and asthma. He has a history of pituitary mass. Patient was seen in the emergency room and admitted on 7/22/2020. He presented with complaints of 9 out of 10 severity frontal headache behind his eyes with some nausea. Pain awoke him from sleep. Underwent CT scan head and subsequent MRI head which showed a pituitary mass measuring 1.6 cm in size. He was found to have hyponatremia and required a short course of 3% saline. Was placed in the intensive care unit for hypertonic saline therapy. Hypertonic saline was discontinued on 7/23/2020. Subjective:   Patient seen and examined. He is alert and awake. Patient states that he still has blurry vision on the right side, and headache at the back of his eyes. VS stable, has been afebrile. Patient denies any chest pain, palpitations, or shortness of breath.   He denies any nausea, vomiting, abdominal pain, diarrhea or constipation. Medications:  Reviewed    Infusion Medications    dextrose       Scheduled Medications    insulin lispro  0-12 Units Subcutaneous TID WC    insulin lispro  0-6 Units Subcutaneous Nightly    sodium bicarbonate  1,300 mg Oral BID    potassium & sodium phosphates  1 packet Oral 4x Daily    allopurinol  300 mg Oral Daily    lisinopril  20 mg Oral Daily    finasteride  5 mg Oral Daily    fluticasone  1 spray Each Nostril Daily    budesonide-formoterol  2 puff Inhalation BID    atorvastatin  10 mg Oral Nightly    tamsulosin  0.4 mg Oral Nightly    sodium chloride flush  10 mL Intravenous 2 times per day    [Held by provider] enoxaparin  40 mg Subcutaneous Daily     PRN Meds: ibuprofen, hydrALAZINE, glucose, dextrose, glucagon (rDNA), dextrose, metoprolol, sodium chloride flush, acetaminophen **OR** acetaminophen, polyethylene glycol, promethazine **OR** ondansetron      Intake/Output Summary (Last 24 hours) at 7/25/2020 1202  Last data filed at 7/25/2020 0322  Gross per 24 hour   Intake 1190.02 ml   Output 2225 ml   Net -1034.98 ml       Diet:  DIET CARB CONTROL; Exam:  BP (!) 95/55   Pulse 84   Temp 97.7 °F (36.5 °C) (Oral)   Resp 18   Ht 6' (1.829 m)   Wt 287 lb 4.8 oz (130.3 kg)   SpO2 97%   BMI 38.96 kg/m²     General appearance: No apparent distress, appears stated age and cooperative. Morbidly obese  HEENT: Pupils equal, round, and reactive to light. Conjunctivae/corneas clear. Neck: Supple, with full range of motion. No jugular venous distention. Trachea midline. Respiratory:  Normal respiratory effort. Clear to auscultation, bilaterally without Rales/Wheezes/Rhonchi. Cardiovascular: Regular rate and rhythm with normal S1/S2 without murmurs, rubs or gallops. Abdomen: Soft, non-tender, non-distended with normal bowel sounds.   : testicular enlargement R>L, non tender on palpation  Musculoskeletal: passive and active ROM Sheila Chowdary MD on 7/25/2020 at 12:02 PM

## 2020-07-25 NOTE — CONSULTS
Socioeconomic History    Marital status: Single     Spouse name: Not on file    Number of children: Not on file    Years of education: Not on file    Highest education level: Not on file   Occupational History    Not on file   Social Needs    Financial resource strain: Not on file    Food insecurity     Worry: Not on file     Inability: Not on file    Transportation needs     Medical: Not on file     Non-medical: Not on file   Tobacco Use    Smoking status: Never Smoker    Smokeless tobacco: Never Used   Substance and Sexual Activity    Alcohol use: Not Currently    Drug use: Not Currently    Sexual activity: Not on file   Lifestyle    Physical activity     Days per week: Not on file     Minutes per session: Not on file    Stress: Not on file   Relationships    Social connections     Talks on phone: Not on file     Gets together: Not on file     Attends Restorationist service: Not on file     Active member of club or organization: Not on file     Attends meetings of clubs or organizations: Not on file     Relationship status: Not on file    Intimate partner violence     Fear of current or ex partner: Not on file     Emotionally abused: Not on file     Physically abused: Not on file     Forced sexual activity: Not on file   Other Topics Concern    Not on file   Social History Narrative    Not on file     Family History:   History reviewed. No pertinent family history. PHYSICAL EXAM:  VITALS:  BP (!) 95/55   Pulse 84   Temp 98.2 °F (36.8 °C) (Oral)   Resp 18   Ht 6' (1.829 m)   Wt 287 lb 4.8 oz (130.3 kg)   SpO2 97%   BMI 38.96 kg/m² . Nursing note and vitals reviewed.   HEENT:  normal appearance  Cardiovascular:  no significant peripheral edema  Respiratory:  normal respiratory effort  Gastrointestinal:  abdomen is not distended and is consistent with BMI  Musculoskeletal:  normal range of motion to all extremities  Skin: warm and dry  Neurologic: no focal weakness; no tremor  Psychiatric: normal affect, normal train of thought, cooperative  : Relatively soft bilateral hydroceles right greater than left. Silva catheter in place with clear urine output. DATA:  CBC:   Lab Results   Component Value Date    WBC 6.4 07/25/2020    RBC 3.71 07/25/2020    HGB 11.9 07/25/2020    HCT 33.7 07/25/2020    MCV 90.8 07/25/2020    MCH 32.1 07/25/2020    MCHC 35.3 07/25/2020    RDW 13.9 07/21/2020     07/25/2020    MPV 9.6 07/25/2020     BMP:    Lab Results   Component Value Date     07/25/2020    K 4.3 07/25/2020    CL 95 07/25/2020    CO2 23 07/25/2020    BUN 15 07/25/2020    CREATININE 1.1 07/25/2020    CALCIUM 7.8 07/25/2020    LABGLOM 68 07/25/2020    GLUCOSE 228 07/25/2020     BUN/Creatinine:    Lab Results   Component Value Date    BUN 15 07/25/2020    CREATININE 1.1 07/25/2020     Magnesium:    Lab Results   Component Value Date    MG 2.0 07/25/2020     Phosphorus:    Lab Results   Component Value Date    PHOS 3.3 07/25/2020     PT/INR:  No results found for: PROTIME, INR  U/A:    Lab Results   Component Value Date    COLORU YELLOW 07/23/2020    PHUR 5.5 07/23/2020    LABCAST 0-4 HYALINE 07/23/2020    LABCAST NONE SEEN 07/23/2020    WBCUA 0-2 07/23/2020    RBCUA 5-10 07/23/2020    MUCUS NONE SEEN 07/23/2020    YEAST NONE SEEN 07/23/2020    BACTERIA NONE 07/23/2020    SPECGRAV >=1.030 07/23/2020    LEUKOCYTESUR NEGATIVE 07/23/2020    UROBILINOGEN 0.2 07/23/2020    BILIRUBINUR NEGATIVE 07/23/2020    BLOODU LARGE 07/23/2020    AMORPHOUS NONE SEEN 07/23/2020       Imaging: The patient has had the following relevant imaging to this  consult: Scrotal ultrasound,  which I have independently reviewed along with its accompanying report. The study demonstrates septated bilateral hydroceles right greater than left    US SCROTUM AND TESTICLES  Narrative: PROCEDURE: US SCROTUM AND TESTICLES    CLINICAL INFORMATION: testicular enlargement/swelling . COMPARISON: No prior study.     TECHNIQUE: Grayscale and color flow and spectral Doppler sonographic imaging of the scrotum was performed in the longitudinal and transverse planes. FINDINGS:     Testes: The testicles are of normal size and homogeneous echogenicity without a mass. No evidence of orchitis. The right testicle measures 5.1 x 3.8 x 2.3 cm. The left testicle measures 4.4 x 2.9 x 3 cm. Testicular vascularity: Arterial and venous blood flow are seen within both testicles, no evidence of torsion. Flow is relatively symmetric by color-flow ultrasound. Hydrocele:    RIGHT: There is a very large complex right hydrocele with multiple septations and loculations and debris. LEFT: There is a moderate to large left hydrocele with debris. Epididymides: No evidence of epididymitis. RIGHT: Not visualized    LEFT: There is a lobulated 1.5 x 0.8 x 0.8 cm left epididymal head cyst or spermatocele which contains a small amount of debris. Varicocele: There is no varicocele. Inguinal canals: There is no evidence of an inguinal hernia. Impression:  No evidence of testicular torsion, epididymoorchitis, or mass. Very large complex right hydrocele, see above. Moderate to large left hydrocele with debris. 1.5 x 0.8 x 0.8 cm left epididymal head cyst or spermatocele with a small amount of debris. **This report has been created using voice recognition software. It may contain minor errors which are inherent in voice recognition technology. **    Final report electronically signed by Dr. Merced Escobar on 7/25/2020 3:42 AM            Thank you for including us in the care of Rg Ruiz MD  07/25/20 4:07 PM  Urology

## 2020-07-26 NOTE — PLAN OF CARE
Problem: Pain:  Goal: Pain level will decrease  Description: Patient complained of pain in head rating it a 6 on the 0-10 scale. Pain medication given as ordered and needed. Patient voiced satisfaction with this. Also instructed patient on distraction, repositioning, and ambulation as non-pharmacological methods of pain relief. Patient voiced understanding. Outcome: Ongoing  Note: Patient states pain behind his eyes at 4/10. Patient declined any tylenol. Rest and lights dimmed for comfort. Problem: Nausea/Vomiting:  Goal: Absence of nausea/vomiting  Description: Patient complaining of nausea and dry-heaving. Phenergan given PO. Outcome: Ongoing  Note: No N/V noted. Problem: Skin Integrity:  Goal: Absence of new skin breakdown  Description: Patient turns self. Reminded patient to turn every two hours. Outcome: Ongoing  Note: Bruising scattered throughout extremities. Patient repositions self in bed. Problem: Physical Regulation:  Goal: Will show no signs and symptoms of electrolyte imbalance  Description: Results for Ramos Maker (MRN [id number]) as of 7/22/2020 04:51    7/22/2020 03:39  Sodium: 122 (L)    Outcome: Ongoing  Note: Monitoring Na level, 128 today. Afebrile. Problem: Discharge Planning:  Goal: Discharged to appropriate level of care  Description: Discharged to appropriate level of care  Outcome: Ongoing  Note: [facility] Problem: Fluid Volume:  Goal: Maintenance of adequate hydration will improve  Description: Maintenance of adequate hydration will improve  Outcome: Ongoing  Note: 1200 ml fluid restriction. Care plan reviewed with patient. Patient verbalized understanding of the plan of care and contributed to goal setting.

## 2020-07-26 NOTE — PROGRESS NOTES
LUCILA to patient's room to transport patient to [facility] [phone]. All patient's personal belongings sent with patient.

## 2020-07-28 LAB
BLOOD CULTURE, ROUTINE: NORMAL
BLOOD CULTURE, ROUTINE: NORMAL
TESTOSTERONE TOTAL: 12 NG/DL (ref 300–720)

## 2020-07-28 NOTE — DISCHARGE SUMMARY
Hospital Medicine Discharge Summary      Patient Identification:   Mulugeta García   : 1960  MRN: [id number]   Account: [de-identified]      Patient's PCP: Alfa Kyle DO    Admit Date: 2020     Discharge Date: 2020     Admitting Physician: Angel Mason DO     Discharge Physician: Brandie Tierney MD     Discharge Diagnoses: Active Hospital Problems    Diagnosis Date Noted    Pituitary adenoma [facility]) [D35.2] 2020    Other headache syndrome [G44.89] 2020    Pituitary apoplexy ([facility] [address] [E23.6] 2020    Hypertensive urgency [I16.0]     Hyponatremia [E87.1] 2020     Pituitary macroadenoma, 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 Superiorly the lesion contacts the optic chiasm. neurology and neurosurgery consulted  Consulted endocrinology  Transfer to [facility]     Adrenal insufficiency   Patient started on hydrocortisone  Endo consult     Hyponatremia, SIADH vs Adrenal Insufficiency  Urine osmolality 784.  Urine sodium 172.    Lowest sodium documented was 118  Nephrology consulted and managing  Patient received hypertonic saline in ICU, also received tolvaptan  Serum sodium improving, now at 130 (from 123). Salt tabs stopped for now. Given D5W for 3 hours to prevent overcorrection on 2020  Maximum correction should be limited to 8meqs/24 hour     Testicular enlargement, R  Noted to be getting bigger over the past few months  Seen a Urologist in [facility] in the past, no intervention done then  Testicular US showed Very large complex right hydrocele, Moderate to large left hydrocele with debris, 1.5 x 0.8 x 0.8 cm left epididymal head cyst or spermatocele with a small amount of debris.    Consulted Urology, no urgency for surgery at this time     Diabetes mellitus Type II, uncontrolled  Takes metformin at 91/52 (!) 102/57   Pulse: 84  76 84   Resp:   18 18   Temp:  98.2 °F (36.8 °C) 98.7 °F (37.1 °C) 98.1 °F (36.7 °C)   TempSrc:  Oral Oral Oral   SpO2:   94% 93%   Weight:       Height:         Weight: Weight: 287 lb 4.8 oz (130.3 kg)     24 hour intake/output:No intake or output data in the 24 hours ending 07/28/20 0843      General appearance: No apparent distress, appears stated age and cooperative. Morbidly obese  HEENT: Pupils equal, round, and reactive to light. Conjunctivae/corneas clear. Neck: Supple, with full range of motion. No jugular venous distention. Trachea midline. Respiratory:  Normal respiratory effort. Clear to auscultation, bilaterally without Rales/Wheezes/Rhonchi. Cardiovascular: Regular rate and rhythm with normal S1/S2 without murmurs, rubs or gallops. Abdomen: Soft, non-tender, non-distended with normal bowel sounds. : testicular enlargement R>L, non tender on palpation  Musculoskeletal: passive and active ROM x 4 extremities. Skin: Skin color, texture, turgor normal.  No rashes or lesions. Neurologic:  Neurovascularly intact without any focal sensory/motor deficits. Cranial nerves: II-XII intact, grossly non-focal.  Psychiatric: Alert and oriented, thought content appropriate, normal insight  Capillary Refill: Brisk,< 3 seconds   Peripheral Pulses: +2 palpable, equal bilaterally     Labs:  For convenience and continuity at follow-up the following most recent labs are provided:      CBC:    Lab Results   Component Value Date    WBC 6.4 07/25/2020    HGB 11.9 07/25/2020    HCT 33.7 07/25/2020     07/25/2020       Renal:    Lab Results   Component Value Date     07/25/2020    K 4.3 07/25/2020    CL 95 07/25/2020    CO2 23 07/25/2020    BUN 15 07/25/2020    CREATININE 1.1 07/25/2020    CALCIUM 7.8 07/25/2020    PHOS 3.3 07/25/2020         Significant Diagnostic Studies    Radiology:          Consults:     IP CONSULT TO NEUROSURGERY  IP CONSULT TO NEUROLOGY  IP CONSULT TO NEPHROLOGY  IP CONSULT TO SPIRITUAL SERVICES  IP CONSULT TO SPIRITUAL SERVICES  IP CONSULT TO ENDOCRINOLOGY  IP CONSULT TO UROLOGY    Disposition:    [] Home       [] TCU       [] Rehab       [] Psych       [] SNF       [] [place]       [x] Other- [facility]    Condition at Discharge: Stable    Code Status:  Prior     Patient Instructions:    Discharge lab work: Activity: activity as tolerated  Diet: No diet orders on file      Follow-up visits:   Edward Osullivan DO  [address] [place] [address]. Dr. Padmini Hardy [address]  [phone]               Discharge Medications:      Gavin Newman   Home Medication Instructions ROV:[id number]    Printed on:07/28/20 0843   Medication Information                      albuterol sulfate HFA (PROAIR HFA) 108 (90 Base) MCG/ACT inhaler  Inhale 1 puff into the lungs every 4 hours as needed for Wheezing             allopurinol (ZYLOPRIM) 300 MG tablet  Take 300 mg by mouth daily             benazepril (LOTENSIN) 20 MG tablet  Take 20 mg by mouth daily             finasteride (PROSCAR) 5 MG tablet  Take 5 mg by mouth daily             fluticasone (FLONASE) 50 MCG/ACT nasal spray  1 spray by Each Nostril route daily 1-2 sprays in each nostril daily             Fluticasone furoate-vilanterol (BREO ELLIPTA) 200-25 MCG/INH AEPB inhaler  Inhale 1 puff into the lungs daily             glimepiride (AMARYL) 4 MG tablet  Take 4 mg by mouth every morning (before breakfast)             metFORMIN (GLUCOPHAGE) 1000 MG tablet  Take 500 mg by mouth 2 times daily (with meals)             simvastatin (ZOCOR) 20 MG tablet  Take 20 mg by mouth nightly             tamsulosin (FLOMAX) 0.4 MG capsule  Take 0.4 mg by mouth nightly                 Time Spent on discharge is more than 45 minutes in the examination, evaluation, counseling and review of medications and discharge plan. Signed:     Thank you Edward Osullivan DO for the opportunity to be involved in this patient's

## 2020-07-28 NOTE — ADT AUTH CERT
Systemic or Infectious Condition GRG - Care Day 3 (7/23/2020) by Hernesto Rocha RN         Review Entered  Review Status    7/28/2020 08:36  Completed        Criteria Review       Care Day: 3 Care Date: 7/23/2020 Level of Care: ICU    Guideline Day 2    Clinical Status    ( ) * No ICU or intermediate care needs    Interventions    (X) Inpatient interventions continue    7/28/2020 8:36 AM EDT by Amaury Rousseau      status post hypertonic saline.  Now on oral fluid restriction with reminders of not to spontaneously drink and to maintain fluid restriction.  Continue to trend    * Milestone    Additional Notes    7/23        Vs- 101.1 (38.4)   19   85   113/93  97% RA       Labs    7/23/2020 03:57    CULTURE, BLOOD 1: Rpt       7/23/2020 05:30    Sodium: 124 (L)    Potassium: 3.8    Chloride: 93 (L)    CO2: 18 (L)    BUN: 8    Creatinine: 0.7    Anion Gap: 13.0    Est, Glom Filt Rate: >90    Magnesium: 1.6    Glucose: 212 (H)    Calcium: 7.5 (L)    Phosphorus: 1.9 (L)    Total Protein: 5.4 (L)    Albumin: 3.3 (L)    Alk Phos: 53    ALT: 19    AST: 24    Bilirubin: 0.8    Cortisol: 2.21    WBC: 9.8    RBC: 4.13 (L)    Hemoglobin Quant: 13.2 (L)    Hematocrit: 36.8 (L)    MCV: 89.1    MCH: 32.0    MCHC: 35.9 (H)    MPV: 9.5    RDW-CV: 11.9    RDW-SD: 38.5    Platelet Count: 360    Lymphocytes Absolute: 0.9 (L)    Monocytes Absolute: 1.4 (H)    Eosinophils Absolute: 0.2    Basophils Absolute: 0.0    Seg Neutrophils: 74.3    Segs Absolute: 7.3    Lymphocytes: 9.2    Monocytes: 14.3    Eosinophils: 1.6    Basophils: 0.1    Immature Grans (Abs): 0.05    Immature Granulocytes: 0.5    Nucleated Red Blood Cells: 0       7/23/2020 05:55    CULTURE, BLOOD 2: Rpt          Per IM    Impression and Plan:    1.  Acute hyponatremia.  Initial urine electrolytes were suggestive of SIADH    Serum sodium worsened with normal saline replacement as well as patient drinking excessive amount of water yesterday.  Sodium acutely dropped and needed to be started on 3% saline    Was transferred to ICU last night and tolerated 3% saline well    Appropriate correction so far    Stop 3% saline and follow serial sodium levels    Continue with free water restriction    Will c/w sodium chloride tablets    May consider low-dose Lasix    2.  Mild metabolic acidosis will monitor.  Will start patient on sodium bicarbonate, check venous pH in the morning    3.  Hypophosphatemia.  We will replace with Neutra-Phos packets 3 times a day with meals    4.  Pituitary lesion.  Patient seen by neurosurgery, neurosurgery recommends endocrinology evaluation    5.  Hyperglycemia    6.  Hypertension.  Continue with lisinopril          Per Pulm/CC    Assessment and Plan(All pulmonary edema, renal failure, PE, and respiratory failure diagnoses are acute in nature unless otherwise specified):          1. 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  Urine osmolality 784.  Urine sodium 172.  He failed to comply with fluid restriction.  He is status post hypertonic saline.  Now on oral fluid restriction with reminders of not to spontaneously drink and to maintain fluid restriction.  Continue to trend.  Screen for adrenal insufficiency. 2. Pituitary mass: Seen by neurology and neurosurgery.  No neurosurgical intervention required acutely.  Neurosurgery recommends outpatient referral for visual field assessment, and endocrine assessment. 3. Diabetes mellitus: Minimal proteinuria. On lisinopril for renal protection. 4. Hypertension: Lisinopril. 5. Hyperlipidemia: Statin therapy. 6. Gout: Prophylaxis with allopurinol. 7. BPH: Finasteride and Flomax. 8. Low cortisol: Suspect adrenal insufficiency.  Cosyntropin stimulation test pending.  Currently not receiving systemic steroids. 9. Fever: Isolated.  May be related to adrenal insufficiency.  No evidence of active infection.

## 2020-07-28 NOTE — ADT AUTH CERT
Systemic or Infectious Condition GRG - Care Day 5 (7/25/2020) by Mario Sandoval RN         Review Entered  Review Status    7/28/2020 08:37  Completed        Criteria Review       Care Day: 5 Care Date: 7/25/2020 Level of Care: ICU    Guideline Day 2    Clinical Status    (X) * No ICU or intermediate care needs    Interventions    (X) Inpatient interventions continue    7/28/2020 8:37 AM EDT by Ramos Beltran      pending serum cortisol, LH, FSH, somatomedin, testosterone and prolactin to determine if this is a functioning adenoma  Consult endocrinology    * Milestone    Additional Notes    7/25        Vs- 97.7 (36.5)   18   79   88/50   97% RA       Labs    7/25/2020 05:15    Sodium: 131 (L)    Potassium: 4.1    Chloride: 99    CO2: 23    BUN: 15    Creatinine: 1.1    Anion Gap: 9.0    Est, Glom Filt Rate: 68 (A)    Magnesium: 2.0    Glucose: 228 (H)    Calcium: 7.8 (L)    Phosphorus: 3.3    Total Protein: 5.2 (L)    AVERAGE GLUCOSE: 285 (H)    Albumin: 2.8 (L)    Alk Phos: 48    ALT: 20    AST: 26    Bilirubin: 0.4    Hemoglobin A1C: 11.5 (H)    WBC: 6.4    RBC: 3.71 (L)    Hemoglobin Quant: 11.9 (L)    Hematocrit: 33.7 (L)    MCV: 90.8    MCH: 32.1    MCHC: 35.3    MPV: 9.6    RDW-CV: 12.3    RDW-SD: 40.5    Platelet Count: 041    Lymphocytes Absolute: 0.6 (L)    Monocytes Absolute: 0.9    Eosinophils Absolute: 0.2    Basophils Absolute: 0.0    Seg Neutrophils: 72.5    Segs Absolute: 4.6    Lymphocytes: 9.0    Monocytes: 13.8    Eosinophils: 3.6    Basophils: 0.5    Immature Grans (Abs): 0.04    Immature Granulocytes: 0.6    Nucleated Red Blood Cells: 0       7/25/2020 08:20    Osmolality: 278    Cortisol: 2.50    Cortisol Collection Info: AM Specimen    FSH: 1.9    LH: 1.4    Prolactin: 1.3    T4 Free: 0.95       7/25/2020 15:30    Sodium: 128 (L)    Potassium: 4.3    Chloride: 95 (L)    CO2: 23    Anion Gap: 10.0       Per IM    Pituitary macroadenoma, 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 Superiorly the lesion contacts the optic chiasm. neurology and neurosurgery consulted    pending serum cortisol, LH, FSH, somatomedin, testosterone and prolactin to determine if this is a functioning adenoma    Consult endocrinology         Low cortisol rule out adrenal insufficiency    ACTH pending    Cosyntropin stimulation test pending.      Currently not receiving systemic steroids, consider starting hydrocortisone    Endo consult         Hyponatremia suggestive of SIADH    Urine osmolality 784.  Urine sodium 172.      Lowest sodium documented was 118    He failed to comply with fluid restriction.      Nephrology consulted    Patient received hypertonic saline in ICU, also received tolvaptan    Serum sodium improving, now at 130 (from 123). Salt tabs stopped for now. Given D5W for 3 hours to prevent overcorrection on 7/24/2020    Maximum correction should be limited to 8meqs/24 hour         Testicular enlargement, R    Noted to be getting bigger over the past few months    Seen a Urologist in [facility] in the past, no intervention done then    Testicular US showed Very large complex right hydrocele, Moderate to large left hydrocele with debris, 1.5 x 0.8 x 0.8 cm left epididymal head cyst or spermatocele with a small amount of debris. Consult Urology         Diabetes mellitus Type II, uncontrolled    Takes metformin at home    A1C 11.5%    Minimal proteinuria .     On lisinopril for renal protection. Start moderate dose Humalog SS         Hypertension    Lisinopril. BP controlled         Hyperlipidemia    Continue Statin therapy         Gout    Prophylaxis with allopurinol.         BPH    Continue Finasteride and Flomax.         Fever    Has been occurring occasionally    May be related to adrenal insufficiency or pituitary apoplexy.     No evidence of active infection necessitating antibiotic treatment    Continue to monitor    Add ibuprofen on top of tylenol prn for fever         Morbid obesity    Diet and lifestyle modification         Addendum    Discussed with Dr. Eleanor Arias and he suggests immediate transfer to a tertiary facility    He and his sister chose [facility]    Transfer initiated and discussed with Transfer center          Per Nephrology    Impression and Plan:    1. Hyponatremia due to SIADH: overall improved, will resume fluid restriction at 1.2 liters and salt tabs.  Recheck this afternoon since it has been difficult to control    2. Metabolic acidosis: improved, reduce bicarb to 650 mg BID    3. HTN; controlled    4. Pituitary adenoma    5. DM with hyperglycemia    6. Hypophosphatemia, better          Per Endocrinology    Assessment    Macroadenoma 1.6 cm with hemorrhagic component. Normal Prolactin    Central Hypoadrenal    Central Hypothyroid    Uncontrolled Diabetes Mellitus type 2    Hyponatremia    Hydrocele bilateral         Plan    Lantus plus sliding scale    Hydrocortisone 25 mcg stat and 12.5    Synthyroid 75 mcg daily    Transfer to [facility] for pituitary hemorrhage          Per Urology    Urologic Impression: Patient with bilateral hydroceles right greater than left.  No signs of orchitis. Peggy Cason uncomfortable for patient but pre-existing.           Urologic Plans / Recommendations: No acute intervention needed for his chronic hydroceles.  Patient could benefit from elective hydrocelectomy once his acute non-urologic problems stabilized.  Patient can follow-up with urology as an outpatient.  I also recommend that patient have his Silva catheter removed.  Continue home dose tamsulosin.

## 2020-07-29 LAB
ACTH: 5.7 PG/ML (ref 7.2–63.3)
SOMATOMEDIN IGF 1 Z-SCORE: 1.3
SOMATOMEDIN: 180 NG/ML (ref 53–206)

## 2020-08-05 ENCOUNTER — HOSPITAL ENCOUNTER (OUTPATIENT)
Age: 60
Setting detail: SPECIMEN
Discharge: HOME | End: 2020-08-05
Payer: COMMERCIAL

## 2020-08-05 LAB — SODIUM BLD-SCNC: 137 MEQ/L (ref 135–145)

## 2020-08-05 PROCEDURE — 83935 ASSAY OF URINE OSMOLALITY: CPT

## 2020-08-05 PROCEDURE — 84295 ASSAY OF SERUM SODIUM: CPT

## 2020-08-05 PROCEDURE — 36415 COLL VENOUS BLD VENIPUNCTURE: CPT

## 2020-08-06 ENCOUNTER — HOSPITAL ENCOUNTER (OUTPATIENT)
Age: 60
Discharge: HOME | End: 2020-08-06
Payer: COMMERCIAL

## 2020-08-06 ENCOUNTER — HOSPITAL ENCOUNTER (OUTPATIENT)
Dept: GENERAL RADIOLOGY | Age: 60
Discharge: HOME | End: 2020-08-06
Payer: COMMERCIAL

## 2020-08-06 LAB — OSMOLALITY URINE: 421 MOSMOL/KG (ref 250–750)

## 2020-08-06 PROCEDURE — 73110 X-RAY EXAM OF WRIST: CPT

## 2020-10-01 ENCOUNTER — HOSPITAL ENCOUNTER (OUTPATIENT)
Age: 60
Discharge: HOME | End: 2020-10-01
Payer: COMMERCIAL

## 2020-10-01 ENCOUNTER — HOSPITAL ENCOUNTER (OUTPATIENT)
Dept: GENERAL RADIOLOGY | Age: 60
Discharge: HOME | End: 2020-10-01
Payer: COMMERCIAL

## 2020-10-01 PROCEDURE — 73110 X-RAY EXAM OF WRIST: CPT

## 2020-10-07 ENCOUNTER — HOSPITAL ENCOUNTER (OUTPATIENT)
Age: 60
Discharge: HOME | End: 2020-10-07
Payer: COMMERCIAL

## 2020-10-07 PROCEDURE — U0003 INFECTIOUS AGENT DETECTION BY NUCLEIC ACID (DNA OR RNA); SEVERE ACUTE RESPIRATORY SYNDROME CORONAVIRUS 2 (SARS-COV-2) (CORONAVIRUS DISEASE [COVID-19]), AMPLIFIED PROBE TECHNIQUE, MAKING USE OF HIGH THROUGHPUT TECHNOLOGIES AS DESCRIBED BY CMS-2020-01-R: HCPCS

## 2020-10-09 LAB — SARS-COV-2: NOT DETECTED

## 2021-07-15 ENCOUNTER — HOSPITAL ENCOUNTER (OUTPATIENT)
Age: 61
Discharge: HOME | End: 2021-07-15
Payer: COMMERCIAL

## 2021-07-15 ENCOUNTER — HOSPITAL ENCOUNTER (OUTPATIENT)
Dept: CT IMAGING | Age: 61
Discharge: HOME | End: 2021-07-15
Payer: COMMERCIAL

## 2021-07-15 DIAGNOSIS — R19.4 CHANGE IN BOWEL HABITS: ICD-10-CM

## 2021-07-15 DIAGNOSIS — R10.30 LOWER ABDOMINAL PAIN: ICD-10-CM

## 2021-07-15 LAB
CREATININE, WHOLE BLOOD: 0.9 MG/DL (ref 0.5–1.2)
ESTIMATED GFR, PCACC: > 90 ML/MIN/1.73M2

## 2021-07-15 PROCEDURE — 6360000004 HC RX CONTRAST MEDICATION: Performed by: INTERNAL MEDICINE

## 2021-07-15 PROCEDURE — 74177 CT ABD & PELVIS W/CONTRAST: CPT

## 2021-07-15 PROCEDURE — 82565 ASSAY OF CREATININE: CPT

## 2021-07-15 RX ADMIN — IOPAMIDOL 100 ML: 755 INJECTION, SOLUTION INTRAVENOUS at 16:41

## 2021-07-15 RX ADMIN — IOHEXOL 50 ML: 240 INJECTION, SOLUTION INTRATHECAL; INTRAVASCULAR; INTRAVENOUS; ORAL at 16:41

## 2021-09-20 ENCOUNTER — NURSE ONLY (OUTPATIENT)
Dept: LAB | Age: 61
End: 2021-09-20

## 2023-04-13 ENCOUNTER — HOSPITAL ENCOUNTER (OUTPATIENT)
Age: 63
Discharge: HOME | End: 2023-04-13
Payer: COMMERCIAL

## 2024-07-10 ENCOUNTER — HOSPITAL ENCOUNTER (OUTPATIENT)
Dept: CT IMAGING | Age: 64
Discharge: HOME | End: 2024-07-10
Payer: COMMERCIAL

## 2024-07-10 ENCOUNTER — HOSPITAL ENCOUNTER (OUTPATIENT)
Age: 64
Discharge: HOME | End: 2024-07-10
Payer: COMMERCIAL

## 2024-07-10 DIAGNOSIS — K59.00 CONSTIPATION, UNSPECIFIED CONSTIPATION TYPE: ICD-10-CM

## 2024-07-10 LAB
ALBUMIN SERPL BCP-MCNC: 4.2 GM/DL (ref 3.4–5)
ALP SERPL-CCNC: 71 U/L (ref 46–116)
ALT SERPL W P-5'-P-CCNC: 35 U/L (ref 14–63)
ANION GAP SERPL CALC-SCNC: 7 MEQ/L (ref 8–16)
AST SERPL W P-5'-P-CCNC: 27 U/L (ref 15–37)
BASOPHILS # BLD: 0.5 % (ref 0–3)
BASOPHILS ABSOLUTE: 0 THOU/MM3 (ref 0–0.1)
BILIRUB SERPL-MCNC: 0.7 MG/DL (ref 0.2–1)
BUN SERPL-MCNC: 12 MG/DL (ref 7–18)
CALCIUM SERPL-MCNC: 8.8 MG/DL (ref 8.5–10.1)
CHLORIDE SERPL-SCNC: 94 MEQ/L (ref 98–107)
CO2 SERPL-SCNC: 29 MEQ/L (ref 21–32)
CREAT BLD-MCNC: 0.9 MG/DL (ref 0.5–1.2)
CREAT SERPL-MCNC: 1.1 MG/DL (ref 0.6–1.3)
EOSINOPHILS ABSOLUTE: 0 THOU/MM3 (ref 0–0.5)
EOSINOPHILS RELATIVE PERCENT: 0.5 % (ref 0–4)
GFR SERPL CREATININE-BSD FRML MDRD: 75 ML/MIN/1.73M2
GFR SERPL CREATININE-BSD FRML MDRD: > 90 ML/MIN/1.73M2
GLUCOSE SERPL-MCNC: 134 MG/DL (ref 74–106)
HCT VFR BLD CALC: 39.2 % (ref 42–52)
HEMOGLOBIN: 13.8 GM/DL (ref 14–18)
IMMATURE GRANS (ABS): 0 THOU/MM3 (ref 0–0.07)
IMMATURE GRANULOCYTES %: 0 %
LYMPHOCYTES # BLD AUTO: 8.8 % (ref 15–47)
LYMPHOCYTES ABSOLUTE: 0.5 THOU/MM3 (ref 1–4.8)
MCH RBC QN AUTO: 31.7 PG (ref 26–32)
MCHC RBC AUTO-ENTMCNC: 35.2 GM/DL (ref 31–35)
MCV RBC AUTO: 90.1 FL (ref 80–94)
MONOCYTES: 0.3 THOU/MM3 (ref 0.3–1.3)
MONOCYTES: 5.3 % (ref 0–12)
NEUTROPHILS ABSOLUTE: 5.3 THOU/MM3 (ref 1.8–7.7)
PDW BLD-RTO: 12.6 % (ref 11.5–14.9)
PLATELET # BLD AUTO: 206 THOU/MM3 (ref 130–400)
PMV BLD AUTO: 9.4 FL (ref 9.4–12.4)
POTASSIUM SERPL-SCNC: 4.5 MEQ/L (ref 3.5–5.1)
PROT SERPL-MCNC: 7.4 GM/DL (ref 6.4–8.2)
RBC # BLD: 4.35 MILL/MM3 (ref 4.5–6.1)
SEG NEUTROPHILS: 84.7 % (ref 43–75)
SODIUM SERPL-SCNC: 130 MEQ/L (ref 136–145)
WBC # BLD: 6.2 THOU/MM3 (ref 4.8–10.8)

## 2024-07-10 PROCEDURE — 85025 COMPLETE CBC W/AUTO DIFF WBC: CPT

## 2024-07-10 PROCEDURE — 6360000004 HC RX CONTRAST MEDICATION: Performed by: FAMILY MEDICINE

## 2024-07-10 PROCEDURE — 82565 ASSAY OF CREATININE: CPT

## 2024-07-10 PROCEDURE — 36415 COLL VENOUS BLD VENIPUNCTURE: CPT

## 2024-07-10 PROCEDURE — 74177 CT ABD & PELVIS W/CONTRAST: CPT

## 2024-07-10 PROCEDURE — 80053 COMPREHEN METABOLIC PANEL: CPT

## 2024-07-10 RX ADMIN — IOPAMIDOL 100 ML: 755 INJECTION, SOLUTION INTRAVENOUS at 13:51
